# Patient Record
Sex: FEMALE | Race: WHITE | NOT HISPANIC OR LATINO | Employment: UNEMPLOYED | URBAN - NONMETROPOLITAN AREA
[De-identification: names, ages, dates, MRNs, and addresses within clinical notes are randomized per-mention and may not be internally consistent; named-entity substitution may affect disease eponyms.]

---

## 2019-07-08 ENCOUNTER — HOSPITAL ENCOUNTER (EMERGENCY)
Facility: HOSPITAL | Age: 54
Discharge: HOME | End: 2019-07-09
Attending: EMERGENCY MEDICINE | Admitting: EMERGENCY MEDICINE

## 2019-07-08 VITALS
DIASTOLIC BLOOD PRESSURE: 72 MMHG | HEART RATE: 74 BPM | HEIGHT: 61 IN | RESPIRATION RATE: 18 BRPM | TEMPERATURE: 97.9 F | OXYGEN SATURATION: 96 % | WEIGHT: 127 LBS | BODY MASS INDEX: 23.98 KG/M2 | SYSTOLIC BLOOD PRESSURE: 137 MMHG

## 2019-07-08 DIAGNOSIS — K29.80 DUODENITIS: Primary | ICD-10-CM

## 2019-07-08 LAB
BACTERIA UR QL AUTO: ABNORMAL /HPF
BILIRUB UR QL STRIP: NEGATIVE
CLARITY UR: ABNORMAL
COLOR UR: YELLOW
GLUCOSE UR STRIP-MCNC: NEGATIVE MG/DL
HCG SERPL QL: NEGATIVE
HGB UR QL STRIP.AUTO: ABNORMAL
HYALINE CASTS UR QL AUTO: ABNORMAL /LPF
KETONES UR QL STRIP: NEGATIVE
LEUKOCYTE ESTERASE UR QL STRIP.AUTO: ABNORMAL
NITRITE UR QL STRIP: NEGATIVE
PH UR STRIP.AUTO: 7 [PH] (ref 5–8)
PROT UR QL STRIP: NEGATIVE
RBC # UR: ABNORMAL /HPF
REF LAB TEST METHOD: ABNORMAL
SP GR UR STRIP: 1.02 (ref 1–1.03)
SQUAMOUS #/AREA URNS HPF: ABNORMAL /HPF
UROBILINOGEN UR QL STRIP: ABNORMAL
WBC UR QL AUTO: ABNORMAL /HPF

## 2019-07-08 PROCEDURE — 99283 EMERGENCY DEPT VISIT LOW MDM: CPT

## 2019-07-08 PROCEDURE — 83690 ASSAY OF LIPASE: CPT | Performed by: EMERGENCY MEDICINE

## 2019-07-08 PROCEDURE — 80053 COMPREHEN METABOLIC PANEL: CPT | Performed by: EMERGENCY MEDICINE

## 2019-07-08 PROCEDURE — 81001 URINALYSIS AUTO W/SCOPE: CPT | Performed by: EMERGENCY MEDICINE

## 2019-07-08 PROCEDURE — 84703 CHORIONIC GONADOTROPIN ASSAY: CPT | Performed by: EMERGENCY MEDICINE

## 2019-07-08 PROCEDURE — 85025 COMPLETE CBC W/AUTO DIFF WBC: CPT | Performed by: EMERGENCY MEDICINE

## 2019-07-08 PROCEDURE — 85007 BL SMEAR W/DIFF WBC COUNT: CPT | Performed by: EMERGENCY MEDICINE

## 2019-07-08 PROCEDURE — 85060 BLOOD SMEAR INTERPRETATION: CPT | Performed by: EMERGENCY MEDICINE

## 2019-07-09 ENCOUNTER — APPOINTMENT (OUTPATIENT)
Dept: CT IMAGING | Facility: HOSPITAL | Age: 54
End: 2019-07-09

## 2019-07-09 LAB
ALBUMIN SERPL-MCNC: 4.36 G/DL (ref 3.5–5.2)
ALBUMIN/GLOB SERPL: 1.4 G/DL
ALP SERPL-CCNC: 80 U/L (ref 39–117)
ALT SERPL W P-5'-P-CCNC: 16 U/L (ref 1–33)
ANION GAP SERPL CALCULATED.3IONS-SCNC: 11.4 MMOL/L (ref 5–15)
AST SERPL-CCNC: 17 U/L (ref 1–32)
BILIRUB SERPL-MCNC: <0.2 MG/DL (ref 0.2–1.2)
BUN BLD-MCNC: 16 MG/DL (ref 6–20)
BUN/CREAT SERPL: 17.2 (ref 7–25)
CALCIUM SPEC-SCNC: 9.1 MG/DL (ref 8.6–10.5)
CHLORIDE SERPL-SCNC: 107 MMOL/L (ref 98–107)
CO2 SERPL-SCNC: 24.6 MMOL/L (ref 22–29)
CREAT BLD-MCNC: 0.93 MG/DL (ref 0.57–1)
CYTOLOGIST CVX/VAG CYTO: NORMAL
DEPRECATED RDW RBC AUTO: 43.8 FL (ref 37–54)
ERYTHROCYTE [DISTWIDTH] IN BLOOD BY AUTOMATED COUNT: 12.9 % (ref 12.3–15.4)
GFR SERPL CREATININE-BSD FRML MDRD: 63 ML/MIN/1.73
GLOBULIN UR ELPH-MCNC: 3 GM/DL
GLUCOSE BLD-MCNC: 105 MG/DL (ref 65–99)
HCT VFR BLD AUTO: 41.4 % (ref 34–46.6)
HGB BLD-MCNC: 13.6 G/DL (ref 12–15.9)
HOLD SPECIMEN: NORMAL
HOLD SPECIMEN: NORMAL
HYPOCHROMIA BLD QL: ABNORMAL
LIPASE SERPL-CCNC: 43 U/L (ref 13–60)
LYMPHOCYTES # BLD MANUAL: 6.81 10*3/MM3 (ref 0.7–3.1)
LYMPHOCYTES NFR BLD MANUAL: 2 % (ref 5–12)
LYMPHOCYTES NFR BLD MANUAL: 57 % (ref 19.6–45.3)
MACROCYTES BLD QL SMEAR: ABNORMAL
MCH RBC QN AUTO: 31.6 PG (ref 26.6–33)
MCHC RBC AUTO-ENTMCNC: 32.9 G/DL (ref 31.5–35.7)
MCV RBC AUTO: 96.3 FL (ref 79–97)
MONOCYTES # BLD AUTO: 0.24 10*3/MM3 (ref 0.1–0.9)
NEUTROPHILS # BLD AUTO: 4.9 10*3/MM3 (ref 1.7–7)
NEUTROPHILS NFR BLD MANUAL: 41 % (ref 42.7–76)
PATH INTERP BLD-IMP: NORMAL
PLAT MORPH BLD: NORMAL
PLATELET # BLD AUTO: 263 10*3/MM3 (ref 140–450)
PMV BLD AUTO: 9.5 FL (ref 6–12)
POTASSIUM BLD-SCNC: 4 MMOL/L (ref 3.5–5.2)
PROT SERPL-MCNC: 7.4 G/DL (ref 6–8.5)
RBC # BLD AUTO: 4.3 10*6/MM3 (ref 3.77–5.28)
SODIUM BLD-SCNC: 143 MMOL/L (ref 136–145)
WBC NRBC COR # BLD: 11.94 10*3/MM3 (ref 3.4–10.8)
WHOLE BLOOD HOLD SPECIMEN: NORMAL
WHOLE BLOOD HOLD SPECIMEN: NORMAL

## 2019-07-09 PROCEDURE — 96365 THER/PROPH/DIAG IV INF INIT: CPT

## 2019-07-09 PROCEDURE — 25010000002 PROMETHAZINE PER 50 MG: Performed by: EMERGENCY MEDICINE

## 2019-07-09 PROCEDURE — 96361 HYDRATE IV INFUSION ADD-ON: CPT

## 2019-07-09 PROCEDURE — 96375 TX/PRO/DX INJ NEW DRUG ADDON: CPT

## 2019-07-09 PROCEDURE — 74177 CT ABD & PELVIS W/CONTRAST: CPT | Performed by: RADIOLOGY

## 2019-07-09 PROCEDURE — 0 IOVERSOL 74 % SOLUTION: Performed by: EMERGENCY MEDICINE

## 2019-07-09 PROCEDURE — 74177 CT ABD & PELVIS W/CONTRAST: CPT

## 2019-07-09 PROCEDURE — 25010000002 BUTORPHANOL PER 1 MG: Performed by: EMERGENCY MEDICINE

## 2019-07-09 RX ORDER — SODIUM CHLORIDE 9 MG/ML
125 INJECTION, SOLUTION INTRAVENOUS CONTINUOUS
Status: DISCONTINUED | OUTPATIENT
Start: 2019-07-09 | End: 2019-07-09 | Stop reason: HOSPADM

## 2019-07-09 RX ORDER — BUTORPHANOL TARTRATE 1 MG/ML
0.5 INJECTION, SOLUTION INTRAMUSCULAR; INTRAVENOUS ONCE
Status: COMPLETED | OUTPATIENT
Start: 2019-07-09 | End: 2019-07-09

## 2019-07-09 RX ORDER — SODIUM CHLORIDE 0.9 % (FLUSH) 0.9 %
10 SYRINGE (ML) INJECTION AS NEEDED
Status: DISCONTINUED | OUTPATIENT
Start: 2019-07-09 | End: 2019-07-09 | Stop reason: HOSPADM

## 2019-07-09 RX ORDER — FAMOTIDINE 20 MG/1
20 TABLET, FILM COATED ORAL NIGHTLY
Qty: 30 TABLET | Refills: 0 | Status: SHIPPED | OUTPATIENT
Start: 2019-07-09 | End: 2020-09-28

## 2019-07-09 RX ADMIN — IOVERSOL 100 ML: 741 INJECTION INTRA-ARTERIAL; INTRAVENOUS at 01:20

## 2019-07-09 RX ADMIN — SODIUM CHLORIDE 500 ML: 9 INJECTION, SOLUTION INTRAVENOUS at 01:33

## 2019-07-09 RX ADMIN — BUTORPHANOL TARTRATE 0.5 MG: 1 INJECTION, SOLUTION INTRAMUSCULAR; INTRAVENOUS at 02:15

## 2019-07-09 RX ADMIN — SODIUM CHLORIDE 125 ML/HR: 9 INJECTION, SOLUTION INTRAVENOUS at 01:33

## 2019-07-09 RX ADMIN — PROMETHAZINE HYDROCHLORIDE 12.5 MG: 25 INJECTION INTRAMUSCULAR; INTRAVENOUS at 02:14

## 2019-07-09 NOTE — ED PROVIDER NOTES
Subjective   Patient comes in with ongoing right-sided abdominal pain.  Radiates through to the back.  She has had episodes waxing and waning over the past several weeks.  No injury.  She has had nausea.  No vomiting.  No fever.  No diarrhea.  She is status post cholecystectomy.        History provided by:  Patient  Abdominal Pain   Pain location:  RUQ  Pain quality: aching and dull    Pain radiates to:  Back  Pain severity:  Moderate  Onset quality:  Gradual  Timing:  Constant  Progression:  Waxing and waning  Chronicity:  Recurrent  Context: not alcohol use, not awakening from sleep, not diet changes, not eating, not laxative use, not medication withdrawal, not previous surgeries, not recent illness, not recent sexual activity, not recent travel, not retching, not sick contacts, not suspicious food intake and not trauma    Relieved by:  Nothing  Worsened by:  Eating  Ineffective treatments:  None tried  Associated symptoms: anorexia and nausea    Associated symptoms: no belching, no chest pain, no chills, no constipation, no cough, no diarrhea, no dysuria, no fatigue, no fever, no flatus, no hematemesis, no hematochezia, no hematuria, no melena, no shortness of breath, no sore throat and no vomiting    Risk factors: no alcohol abuse, no aspirin use, not elderly, has not had multiple surgeries, no NSAID use, not obese, not pregnant and no recent hospitalization        Review of Systems   Constitutional: Negative.  Negative for chills, fatigue and fever.   HENT: Negative.  Negative for sore throat.    Eyes: Negative.    Respiratory: Negative.  Negative for cough, chest tightness and shortness of breath.    Cardiovascular: Negative.  Negative for chest pain.   Gastrointestinal: Positive for abdominal pain, anorexia and nausea. Negative for abdominal distention, constipation, diarrhea, flatus, hematemesis, hematochezia, melena and vomiting.   Endocrine: Negative.    Genitourinary: Negative.  Negative for difficulty  urinating, dysuria and hematuria.   Musculoskeletal: Negative.    Skin: Negative.    Allergic/Immunologic: Negative.    Neurological: Negative.    Hematological: Negative.    Psychiatric/Behavioral: Negative.    All other systems reviewed and are negative.      No past medical history on file.    Allergies   Allergen Reactions   • Codeine Nausea And Vomiting       No past surgical history on file.    No family history on file.    Social History     Socioeconomic History   • Marital status:      Spouse name: Not on file   • Number of children: Not on file   • Years of education: Not on file   • Highest education level: Not on file           Objective   Physical Exam   Constitutional: She is oriented to person, place, and time. She appears well-developed and well-nourished.  Non-toxic appearance. She does not appear ill. No distress.   HENT:   Head: Normocephalic and atraumatic.   Mouth/Throat: Oropharynx is clear and moist. No oropharyngeal exudate.   Eyes: EOM are normal. No scleral icterus.   Cardiovascular: Normal rate, regular rhythm, normal heart sounds and intact distal pulses.   No murmur heard.  Pulmonary/Chest: Effort normal and breath sounds normal. No stridor. No respiratory distress. She has no wheezes. She has no rhonchi. She has no rales.   Abdominal: Soft. Normal appearance and bowel sounds are normal. She exhibits no distension. There is tenderness in the periumbilical area. There is no rigidity and no guarding.   Right periumbilical tenderness   Genitourinary:   Genitourinary Comments: No CVAT   Neurological: She is alert and oriented to person, place, and time.   Skin: Skin is warm and dry. Capillary refill takes less than 2 seconds. She is not diaphoretic. No cyanosis. No pallor.   Psychiatric: She has a normal mood and affect. Her behavior is normal.   Nursing note and vitals reviewed.      Procedures           ED Course      CT Abdomen Pelvis With Contrast   ED Interpretation   CT abdomen  and pelvis with contrast   Fax report from virtual radiologic   Impression: Questionable mucosal prominence around the duodenal bulb may represent inflammation.   Signed Mark Mccain MD        Labs Reviewed   COMPREHENSIVE METABOLIC PANEL - Abnormal; Notable for the following components:       Result Value    Glucose 105 (*)     Total Bilirubin <0.2 (*)     All other components within normal limits    Narrative:     GFR Normal >60  Chronic Kidney Disease <60  Kidney Failure <15   URINALYSIS W/ MICROSCOPIC IF INDICATED (NO CULTURE) - Abnormal; Notable for the following components:    Appearance, UA Cloudy (*)     Blood, UA Moderate (2+) (*)     Leuk Esterase, UA Moderate (2+) (*)     All other components within normal limits   CBC WITH AUTO DIFFERENTIAL - Abnormal; Notable for the following components:    WBC 11.94 (*)     All other components within normal limits   URINALYSIS, MICROSCOPIC ONLY - Abnormal; Notable for the following components:    RBC, UA 31-50 (*)     WBC, UA 21-30 (*)     All other components within normal limits   MANUAL DIFFERENTIAL - Abnormal; Notable for the following components:    Neutrophil % 41.0 (*)     Lymphocyte % 57.0 (*)     Monocyte % 2.0 (*)     Lymphocytes Absolute 6.81 (*)     All other components within normal limits   LIPASE - Normal   HCG, SERUM, QUALITATIVE - Normal   RAINBOW DRAW    Narrative:     The following orders were created for panel order Suring Draw.  Procedure                               Abnormality         Status                     ---------                               -----------         ------                     Light Blue Top[[id number]]                                   Final result               Green Top (Gel)[[id number]]                                  Final result               Lavender Top[[id number]]                                     Final result               Gold Top - [facility][[id number]]                                   Final result                  Please view results for these tests on the individual orders.   SLIDE REVIEW, HEMATOLOGY   CBC AND DIFFERENTIAL    Narrative:     The following orders were created for panel order CBC & Differential.  Procedure                               Abnormality         Status                     ---------                               -----------         ------                     CBC Auto Differential[[id number]]        Abnormal            Final result                 Please view results for these tests on the individual orders.   LIGHT BLUE TOP   GREEN TOP   LAVENDER TOP   GOLD TOP - [facility]        Medication List      START taking these medications    esomeprazole 20 MG capsule  Commonly known as:  NEXIUM  Take 1 capsule by mouth Every Morning Before Breakfast.     famotidine 20 MG tablet  Commonly known as:  PEPCID  Take 1 tablet by mouth Every Night.                    MDM  Number of Diagnoses or Management Options  Duodenitis: new and requires workup     Amount and/or Complexity of Data Reviewed  Clinical lab tests: ordered and reviewed  Tests in the radiology section of CPT®: ordered and reviewed  Obtain history from someone other than the patient: yes  Independent visualization of images, tracings, or specimens: yes    Risk of Complications, Morbidity, and/or Mortality  Presenting problems: high  Diagnostic procedures: high  Management options: moderate    Patient Progress  Patient progress: improved        Final diagnoses:   Duodenitis            David Lewis MD  07/09/19 0424

## 2020-09-28 ENCOUNTER — OFFICE VISIT (OUTPATIENT)
Dept: GASTROENTEROLOGY | Facility: CLINIC | Age: 55
End: 2020-09-28

## 2020-09-28 VITALS
TEMPERATURE: 96.9 F | BODY MASS INDEX: 21.26 KG/M2 | WEIGHT: 112.6 LBS | SYSTOLIC BLOOD PRESSURE: 137 MMHG | DIASTOLIC BLOOD PRESSURE: 70 MMHG | OXYGEN SATURATION: 98 % | HEART RATE: 69 BPM | HEIGHT: 61 IN

## 2020-09-28 DIAGNOSIS — R19.7 DIARRHEA, UNSPECIFIED TYPE: ICD-10-CM

## 2020-09-28 DIAGNOSIS — R14.0 BLOATING: ICD-10-CM

## 2020-09-28 DIAGNOSIS — R11.0 NAUSEA: ICD-10-CM

## 2020-09-28 DIAGNOSIS — R10.11 RUQ ABDOMINAL PAIN: Primary | ICD-10-CM

## 2020-09-28 PROCEDURE — 99244 OFF/OP CNSLTJ NEW/EST MOD 40: CPT | Performed by: PHYSICIAN ASSISTANT

## 2020-09-28 PROCEDURE — 83690 ASSAY OF LIPASE: CPT | Performed by: PHYSICIAN ASSISTANT

## 2020-09-28 PROCEDURE — 86140 C-REACTIVE PROTEIN: CPT | Performed by: PHYSICIAN ASSISTANT

## 2020-09-28 PROCEDURE — 82150 ASSAY OF AMYLASE: CPT | Performed by: PHYSICIAN ASSISTANT

## 2020-09-28 PROCEDURE — 85025 COMPLETE CBC W/AUTO DIFF WBC: CPT | Performed by: PHYSICIAN ASSISTANT

## 2020-09-28 PROCEDURE — 80053 COMPREHEN METABOLIC PANEL: CPT | Performed by: PHYSICIAN ASSISTANT

## 2020-09-28 PROCEDURE — 36415 COLL VENOUS BLD VENIPUNCTURE: CPT | Performed by: PHYSICIAN ASSISTANT

## 2020-09-28 NOTE — PROGRESS NOTES
: 1965    Chief Complaint   Patient presents with   • Abdominal Pain       Lolita Ng is a 55 y.o. female who presents to the office today as a consultation from AISLINN Gibbons for evaluation of Abdominal Pain.    History of Present Illness:  She has been having abdominal pain and diarrhea for the past 2 months. Has 3-4 stools daily. Has pictures of her stool (loose brown stool). When first presented, she had dehydration and increased WBC. She was given Flagyl for treatment at first but that did not help. She has been having RUQ abdominal pain which radiates into her right mid back. Pain is sometimes worse after eating. Has weakness, has lost 14 lbs during this illness. Has diarrhea, which is most of the time oily and yellow. Nausea is present all throughout the day. Awakens with dry heaving. Has frequent belching, bloating and increased HR sporadically. Sometimes has dysphagia and describes this as a feeling that there is thickness in her neck. Has history of H pylori, diagnosed with serum testing. Had antibiotics x2 for treatment but never had breath test confirmation.     Has not had any testing for this problem yet.     Review of Systems   Constitutional: Positive for activity change, appetite change, fatigue and unexpected weight change. Negative for chills and fever.   HENT: Positive for trouble swallowing.    Eyes: Negative.    Respiratory: Negative for cough, choking, chest tightness and shortness of breath.    Cardiovascular: Negative for chest pain.   Gastrointestinal: Positive for abdominal distention, abdominal pain, diarrhea and nausea. Negative for anal bleeding, blood in stool, constipation and vomiting.   Endocrine: Negative.    Genitourinary: Negative for difficulty urinating.   Musculoskeletal: Positive for back pain. Negative for neck pain.   Skin: Negative for color change, pallor, rash and wound.   Allergic/Immunologic: Negative for environmental allergies and food  "allergies.   Neurological: Positive for dizziness, light-headedness and headaches.   Hematological: Does not bruise/bleed easily.   Psychiatric/Behavioral: Negative.      I have reviewed and confirmed the accuracy of the ROS as documented by the MA/LPN/RN Juanita Alejo PA-C     History reviewed. No pertinent past medical history.    Past Surgical History:   Procedure Laterality Date   • BREAST IMPLANT REMOVAL     • BREAST IMPLANT SURGERY     • CHOLECYSTECTOMY     • TUBAL ABDOMINAL LIGATION         Family History   Problem Relation Age of Onset   • Ovarian cancer Mother        Social History     Socioeconomic History   • Marital status:      Spouse name: Not on file   • Number of children: Not on file   • Years of education: Not on file   • Highest education level: Not on file   Tobacco Use   • Smoking status: Current Every Day Smoker   • Smokeless tobacco: Never Used   Substance and Sexual Activity   • Alcohol use: Never     Frequency: Never   • Drug use: Never   • Sexual activity: Defer     No current outpatient medications on file.    Allergies:   Codeine    Vitals:  /70 (BP Location: Left arm, Patient Position: Sitting, Cuff Size: Adult)   Pulse 69   Temp 96.9 °F (36.1 °C)   Ht 154.9 cm (61\")   Wt 51.1 kg (112 lb 9.6 oz)   SpO2 98%   BMI 21.28 kg/m²     Physical Exam  Vitals signs reviewed.   Constitutional:       General: She is not in acute distress.     Appearance: Normal appearance. She is well-developed. She is not ill-appearing or diaphoretic.   HENT:      Head: Normocephalic and atraumatic.      Right Ear: External ear normal.      Left Ear: External ear normal.      Nose: Nose normal.      Mouth/Throat:      Comments: Wearing a mask  Eyes:      General: No scleral icterus.        Right eye: No discharge.         Left eye: No discharge.      Conjunctiva/sclera: Conjunctivae normal.   Neck:      Musculoskeletal: Normal range of motion.      Vascular: No JVD.   Cardiovascular:      Rate " and Rhythm: Normal rate and regular rhythm.      Heart sounds: Normal heart sounds. No murmur. No friction rub. No gallop.    Pulmonary:      Effort: Pulmonary effort is normal. No respiratory distress.      Breath sounds: Normal breath sounds. No wheezing or rales.   Chest:      Chest wall: No tenderness.   Abdominal:      General: Bowel sounds are normal. There is no distension.      Palpations: Abdomen is soft. There is no mass.      Tenderness: There is abdominal tenderness (upper, worst in epigastric and RUQ). There is no guarding.   Musculoskeletal: Normal range of motion.         General: No deformity.   Skin:     General: Skin is warm and dry.      Findings: No erythema or rash.   Neurological:      Mental Status: She is alert and oriented to person, place, and time.      Coordination: Coordination normal.   Psychiatric:         Behavior: Behavior normal.         Thought Content: Thought content normal.         Judgment: Judgment normal.      Comments: Anxious affect     Results Review:  CT abd/pelv 2019 duodenal abnormality    Assessment:  1. RUQ abdominal pain    2. Diarrhea, unspecified type    3. Bloating    4. Nausea      Plan:  Orders Placed This Encounter   Procedures   • CT Abdomen Pelvis With Contrast   • Comprehensive Metabolic Panel   • CBC Auto Differential   • Lipase   • C-reactive Protein   • Amylase     Labs will be completed today, she will have CT scan abd/pelv as soon as possible due to severe and persistent GI complaints. Differential diagnoses include duodenal ulcer, choledocholithiasis, gastritis, irritable bowel syndrome, pancreatitis, etc.         Return in about 1 week (around 10/5/2020) for recheck abdominal pain, discussion of results.      Electronically signed 9/29/2020 09:29 EDT  Juanita Alejo PA-C  [facility]

## 2020-09-29 ENCOUNTER — TELEPHONE (OUTPATIENT)
Dept: GASTROENTEROLOGY | Facility: CLINIC | Age: 55
End: 2020-09-29

## 2020-09-29 ENCOUNTER — HOSPITAL ENCOUNTER (OUTPATIENT)
Dept: CT IMAGING | Facility: HOSPITAL | Age: 55
Discharge: HOME | End: 2020-09-29
Admitting: PHYSICIAN ASSISTANT

## 2020-09-29 DIAGNOSIS — R11.0 NAUSEA: ICD-10-CM

## 2020-09-29 DIAGNOSIS — R14.0 BLOATING: ICD-10-CM

## 2020-09-29 DIAGNOSIS — R10.11 RUQ ABDOMINAL PAIN: ICD-10-CM

## 2020-09-29 DIAGNOSIS — R19.7 DIARRHEA, UNSPECIFIED TYPE: ICD-10-CM

## 2020-09-29 DIAGNOSIS — R10.11 RUQ ABDOMINAL PAIN: Primary | ICD-10-CM

## 2020-09-29 LAB
ALBUMIN SERPL-MCNC: 4.6 G/DL (ref 3.5–5.2)
ALBUMIN/GLOB SERPL: 1.3 G/DL
ALP SERPL-CCNC: 63 U/L (ref 39–117)
ALT SERPL W P-5'-P-CCNC: 11 U/L (ref 1–33)
AMYLASE SERPL-CCNC: 79 U/L (ref 28–100)
ANION GAP SERPL CALCULATED.3IONS-SCNC: 8.7 MMOL/L (ref 5–15)
AST SERPL-CCNC: 16 U/L (ref 1–32)
BASOPHILS # BLD AUTO: 0.04 10*3/MM3 (ref 0–0.2)
BASOPHILS NFR BLD AUTO: 0.3 % (ref 0–1.5)
BILIRUB SERPL-MCNC: 0.3 MG/DL (ref 0–1.2)
BUN SERPL-MCNC: 11 MG/DL (ref 6–20)
BUN/CREAT SERPL: 14.1 (ref 7–25)
CALCIUM SPEC-SCNC: 9.6 MG/DL (ref 8.6–10.5)
CHLORIDE SERPL-SCNC: 106 MMOL/L (ref 98–107)
CO2 SERPL-SCNC: 24.3 MMOL/L (ref 22–29)
CREAT SERPL-MCNC: 0.78 MG/DL (ref 0.57–1)
CRP SERPL-MCNC: 0.25 MG/DL (ref 0–0.5)
DEPRECATED RDW RBC AUTO: 45.4 FL (ref 37–54)
EOSINOPHIL # BLD AUTO: 0.04 10*3/MM3 (ref 0–0.4)
EOSINOPHIL NFR BLD AUTO: 0.3 % (ref 0.3–6.2)
ERYTHROCYTE [DISTWIDTH] IN BLOOD BY AUTOMATED COUNT: 12.9 % (ref 12.3–15.4)
GFR SERPL CREATININE-BSD FRML MDRD: 76 ML/MIN/1.73
GLOBULIN UR ELPH-MCNC: 3.5 GM/DL
GLUCOSE SERPL-MCNC: 90 MG/DL (ref 65–99)
HCT VFR BLD AUTO: 41.4 % (ref 34–46.6)
HGB BLD-MCNC: 13.9 G/DL (ref 12–15.9)
IMM GRANULOCYTES # BLD AUTO: 0.02 10*3/MM3 (ref 0–0.05)
IMM GRANULOCYTES NFR BLD AUTO: 0.2 % (ref 0–0.5)
LIPASE SERPL-CCNC: 34 U/L (ref 13–60)
LYMPHOCYTES # BLD AUTO: 3.89 10*3/MM3 (ref 0.7–3.1)
LYMPHOCYTES NFR BLD AUTO: 31.7 % (ref 19.6–45.3)
MCH RBC QN AUTO: 32 PG (ref 26.6–33)
MCHC RBC AUTO-ENTMCNC: 33.6 G/DL (ref 31.5–35.7)
MCV RBC AUTO: 95.4 FL (ref 79–97)
MONOCYTES # BLD AUTO: 0.49 10*3/MM3 (ref 0.1–0.9)
MONOCYTES NFR BLD AUTO: 4 % (ref 5–12)
NEUTROPHILS NFR BLD AUTO: 63.5 % (ref 42.7–76)
NEUTROPHILS NFR BLD AUTO: 7.8 10*3/MM3 (ref 1.7–7)
NRBC BLD AUTO-RTO: 0 /100 WBC (ref 0–0.2)
PLATELET # BLD AUTO: 276 10*3/MM3 (ref 140–450)
PMV BLD AUTO: 10.2 FL (ref 6–12)
POTASSIUM SERPL-SCNC: 4.4 MMOL/L (ref 3.5–5.2)
PROT SERPL-MCNC: 8.1 G/DL (ref 6–8.5)
RBC # BLD AUTO: 4.34 10*6/MM3 (ref 3.77–5.28)
SODIUM SERPL-SCNC: 139 MMOL/L (ref 136–145)
WBC # BLD AUTO: 12.28 10*3/MM3 (ref 3.4–10.8)

## 2020-09-29 PROCEDURE — 25010000002 IOPAMIDOL 61 % SOLUTION: Performed by: PHYSICIAN ASSISTANT

## 2020-09-29 PROCEDURE — 74177 CT ABD & PELVIS W/CONTRAST: CPT

## 2020-09-29 PROCEDURE — 74177 CT ABD & PELVIS W/CONTRAST: CPT | Performed by: RADIOLOGY

## 2020-09-29 RX ADMIN — IOPAMIDOL 100 ML: 612 INJECTION, SOLUTION INTRAVENOUS at 13:43

## 2020-09-29 NOTE — TELEPHONE ENCOUNTER
CT scan read as normal. Labs all normal except increased white blood cell count. I want her to have EGD as soon as possible. Please arrange,

## 2020-10-01 ENCOUNTER — OFFICE VISIT (OUTPATIENT)
Dept: GASTROENTEROLOGY | Facility: CLINIC | Age: 55
End: 2020-10-01

## 2020-10-01 ENCOUNTER — LAB (OUTPATIENT)
Dept: LAB | Facility: HOSPITAL | Age: 55
End: 2020-10-01

## 2020-10-01 VITALS
HEIGHT: 61 IN | HEART RATE: 81 BPM | DIASTOLIC BLOOD PRESSURE: 81 MMHG | SYSTOLIC BLOOD PRESSURE: 154 MMHG | TEMPERATURE: 97.7 F | OXYGEN SATURATION: 99 % | BODY MASS INDEX: 20.96 KG/M2 | WEIGHT: 111 LBS

## 2020-10-01 DIAGNOSIS — R11.2 NAUSEA AND VOMITING, INTRACTABILITY OF VOMITING NOT SPECIFIED, UNSPECIFIED VOMITING TYPE: Primary | ICD-10-CM

## 2020-10-01 DIAGNOSIS — Z01.818 PRE-OPERATIVE CLEARANCE: ICD-10-CM

## 2020-10-01 DIAGNOSIS — Z86.19 HISTORY OF HELICOBACTER PYLORI INFECTION: ICD-10-CM

## 2020-10-01 DIAGNOSIS — R11.2 NAUSEA AND VOMITING, INTRACTABILITY OF VOMITING NOT SPECIFIED, UNSPECIFIED VOMITING TYPE: ICD-10-CM

## 2020-10-01 DIAGNOSIS — R10.11 RUQ ABDOMINAL PAIN: ICD-10-CM

## 2020-10-01 LAB
BILIRUB BLD-MCNC: NEGATIVE MG/DL
CLARITY, POC: CLEAR
COLOR UR: YELLOW
GLUCOSE UR STRIP-MCNC: NEGATIVE MG/DL
KETONES UR QL: NEGATIVE
LEUKOCYTE EST, POC: NEGATIVE
NITRITE UR-MCNC: NEGATIVE MG/ML
PH UR: 7.5 [PH] (ref 5–8)
PROT UR STRIP-MCNC: NEGATIVE MG/DL
RBC # UR STRIP: ABNORMAL /UL
SP GR UR: 1.01 (ref 1–1.03)
UROBILINOGEN UR QL: NORMAL

## 2020-10-01 PROCEDURE — 81003 URINALYSIS AUTO W/O SCOPE: CPT | Performed by: PHYSICIAN ASSISTANT

## 2020-10-01 PROCEDURE — 99214 OFFICE O/P EST MOD 30 MIN: CPT | Performed by: PHYSICIAN ASSISTANT

## 2020-10-01 PROCEDURE — 83013 H PYLORI (C-13) BREATH: CPT

## 2020-10-01 PROCEDURE — 87086 URINE CULTURE/COLONY COUNT: CPT | Performed by: PHYSICIAN ASSISTANT

## 2020-10-01 RX ORDER — PROMETHAZINE HYDROCHLORIDE 12.5 MG/1
12.5 TABLET ORAL EVERY 6 HOURS PRN
Qty: 30 TABLET | Refills: 0 | Status: SHIPPED | OUTPATIENT
Start: 2020-10-01 | End: 2020-10-22

## 2020-10-01 RX ORDER — PANTOPRAZOLE SODIUM 40 MG/1
40 TABLET, DELAYED RELEASE ORAL
Qty: 60 TABLET | Refills: 5 | Status: SHIPPED | OUTPATIENT
Start: 2020-10-01 | End: 2020-10-22 | Stop reason: ALTCHOICE

## 2020-10-01 RX ORDER — SUCRALFATE 1 G/1
1 TABLET ORAL
Qty: 120 TABLET | Refills: 2 | Status: SHIPPED | OUTPATIENT
Start: 2020-10-01 | End: 2020-10-22 | Stop reason: SINTOL

## 2020-10-01 RX ORDER — SUCRALFATE ORAL 1 G/10ML
1 SUSPENSION ORAL
Qty: 840 ML | Refills: 1 | Status: CANCELLED | OUTPATIENT
Start: 2020-10-01

## 2020-10-01 RX ORDER — THIAMINE HCL 50 MG
50 TABLET ORAL DAILY
COMMUNITY
End: 2023-03-03 | Stop reason: ALTCHOICE

## 2020-10-01 NOTE — PROGRESS NOTES
: 1965    Chief Complaint   Patient presents with   • Abdominal Pain   • Nausea       Lolita Ng is a 55 y.o. female who presents to the office today as a follow up appointment regarding Abdominal Pain and Nausea.    History of Present Illness:  Abdominal pain improved since last visit. Now having severe nausea and some vomiting. She has weakness and fatigue. She is afraid that she has a fungal infection or sepsis related to H pylori. Denies any recent fever, did have previous elevated WBC. She feels that her tongue has a coating on it. She has been trying to keep it clean by brushing it. Had CT scan 2 days ago and would like to discuss those results. Has not taken any antibiotics or PPIs in the past 2 weeks.     Previous history:  She has been having abdominal pain and diarrhea for the past 2 months. Has 3-4 stools daily. Has pictures of her stool (loose brown stool). When first presented, she had dehydration and increased WBC. She was given Flagyl for treatment at first but that did not help. She has been having RUQ abdominal pain which radiates into her right mid back. Pain is sometimes worse after eating. Has weakness, has lost 14 lbs during this illness. Has diarrhea, which is most of the time oily and yellow. Nausea is present all throughout the day. Awakens with dry heaving. Has frequent belching, bloating and increased HR sporadically. Sometimes has dysphagia and describes this as a feeling that there is thickness in her neck. Has history of H pylori, diagnosed with serum testing. Had antibiotics x2 for treatment but never had breath test confirmation.     Review of Systems   Constitutional: Positive for activity change, appetite change, fatigue and unexpected weight change. Negative for chills and fever.   HENT: Positive for trouble swallowing.    Eyes: Negative.    Respiratory: Negative for cough, choking, chest tightness and shortness of breath.    Cardiovascular: Negative for chest pain.    "  Gastrointestinal: Positive for abdominal distention, abdominal pain, nausea and vomiting. Negative for anal bleeding, blood in stool, constipation and diarrhea.   Endocrine: Negative.    Genitourinary: Negative for difficulty urinating.   Musculoskeletal: Positive for back pain. Negative for neck pain.   Skin: Negative for color change, pallor, rash and wound.   Allergic/Immunologic: Negative for environmental allergies and food allergies.   Neurological: Positive for dizziness, light-headedness and headaches.   Hematological: Does not bruise/bleed easily.   Psychiatric/Behavioral: Negative.      I have reviewed and confirmed the accuracy of the ROS as documented by the MA/LPN/RN Juanita Alejo PA-C    Past Medical History:   Diagnosis Date   • H. pylori infection        Past Surgical History:   Procedure Laterality Date   • BREAST IMPLANT REMOVAL     • BREAST IMPLANT SURGERY     • CHOLECYSTECTOMY     • TUBAL ABDOMINAL LIGATION         Family History   Problem Relation Age of Onset   • Ovarian cancer Mother        Social History     Socioeconomic History   • Marital status:      Spouse name: Not on file   • Number of children: Not on file   • Years of education: Not on file   • Highest education level: Not on file   Tobacco Use   • Smoking status: Current Every Day Smoker   • Smokeless tobacco: Never Used   Substance and Sexual Activity   • Alcohol use: Never     Frequency: Never   • Drug use: Never   • Sexual activity: Defer       Current Outpatient Medications:   •  Thiamine HCl (vitamin B-1) 50 MG tablet, Take 50 mg by mouth Daily., Disp: , Rfl:     Allergies:   Codeine    Vitals:  /81 (BP Location: Left arm, Patient Position: Sitting, Cuff Size: Adult)   Pulse 81   Temp 97.7 °F (36.5 °C)   Ht 154.9 cm (61\")   Wt 50.3 kg (111 lb)   SpO2 99%   BMI 20.97 kg/m²     Physical Exam  Vitals signs reviewed.   Constitutional:       General: She is in acute distress.      Appearance: Normal appearance. " She is well-developed. She is not ill-appearing or diaphoretic.      Comments: Holding bag under chin   HENT:      Head: Normocephalic and atraumatic.      Right Ear: External ear normal.      Left Ear: External ear normal.      Nose: Nose normal.      Mouth/Throat:      Comments: Wearing a mask  Eyes:      General: No scleral icterus.        Right eye: No discharge.         Left eye: No discharge.      Conjunctiva/sclera: Conjunctivae normal.   Neck:      Musculoskeletal: Normal range of motion.      Vascular: No JVD.   Cardiovascular:      Rate and Rhythm: Normal rate and regular rhythm.      Heart sounds: Normal heart sounds. No murmur. No friction rub. No gallop.    Pulmonary:      Effort: Pulmonary effort is normal. No respiratory distress.      Breath sounds: Normal breath sounds. No wheezing or rales.   Chest:      Chest wall: No tenderness.   Abdominal:      General: Bowel sounds are normal. There is no distension.      Palpations: Abdomen is soft. There is no mass.      Tenderness: There is no abdominal tenderness. There is no guarding.   Musculoskeletal: Normal range of motion.         General: No deformity.   Skin:     General: Skin is warm and dry.      Findings: No erythema or rash.   Neurological:      Mental Status: She is alert and oriented to person, place, and time.      Coordination: Coordination normal.   Psychiatric:         Mood and Affect: Mood normal.         Behavior: Behavior normal.         Thought Content: Thought content normal.         Judgment: Judgment normal.     Results Review:  CT abd/pelv reported as normal. On my review- On my review- liver lesion noted, aortic atherosclerosis, renal cysts, lynne clips, ?duodenum appears similar to previous scan, no increased stool, some tortuosity in hepatic flexure colon.    Assessment:  1. Nausea and vomiting, intractability of vomiting not specified, unspecified vomiting type    2. RUQ abdominal pain    3. History of Helicobacter pylori  infection    4. Pre-operative clearance      Plan:  Orders Placed This Encounter   Procedures   • Urine Culture - Urine, Urine, Random Void   • H. Pylori Breath Test - , Lung   • COVID PRE-OP / PRE-PROCEDURE SCREENING ORDER (NO ISOLATION) - Swab, Nasopharynx   • POC Urinalysis Dipstick, Automated     She will need an esophagogastroduodenoscopy performed with IV general sedation. Will arrange ASAP, scheduled for next available (10/5). All of the risks, benefits and alternatives of this procedure have been discussed with her, all of her questions have been answered and she has elected to proceed. She should follow up in the office after this procedure to discuss the results and further recommendations can be made at that time.    New Medications Ordered This Visit   Medications   • pantoprazole (Protonix) 40 MG EC tablet     Sig: Take 1 tablet by mouth 2 (Two) Times a Day Before Meals.     Dispense:  60 tablet     Refill:  5   • sucralfate (CARAFATE) 1 g tablet     Sig: Take 1 tablet by mouth 4 (Four) Times a Day Before Meals & at Bedtime.     Dispense:  120 tablet     Refill:  2   • promethazine (PHENERGAN) 12.5 MG tablet     Sig: Take 1 tablet by mouth Every 6 (Six) Hours As Needed for Nausea or Vomiting.     Dispense:  30 tablet     Refill:  0     Will treat as suspected ulcer. She declined reporting to the ED and would like to try outpatient treatment for now. Call with concerns.          Return for follow up after procedure to discuss results.      Electronically signed 10/1/2020 16:18 EDT  Juanita Alejo PA-C  [facility]

## 2020-10-01 NOTE — H&P (VIEW-ONLY)
: 1965    Chief Complaint   Patient presents with   • Abdominal Pain   • Nausea       Lolita Ng is a 55 y.o. female who presents to the office today as a follow up appointment regarding Abdominal Pain and Nausea.    History of Present Illness:  Abdominal pain improved since last visit. Now having severe nausea and some vomiting. She has weakness and fatigue. She is afraid that she has a fungal infection or sepsis related to H pylori. Denies any recent fever, did have previous elevated WBC. She feels that her tongue has a coating on it. She has been trying to keep it clean by brushing it. Had CT scan 2 days ago and would like to discuss those results. Has not taken any antibiotics or PPIs in the past 2 weeks.     Previous history:  She has been having abdominal pain and diarrhea for the past 2 months. Has 3-4 stools daily. Has pictures of her stool (loose brown stool). When first presented, she had dehydration and increased WBC. She was given Flagyl for treatment at first but that did not help. She has been having RUQ abdominal pain which radiates into her right mid back. Pain is sometimes worse after eating. Has weakness, has lost 14 lbs during this illness. Has diarrhea, which is most of the time oily and yellow. Nausea is present all throughout the day. Awakens with dry heaving. Has frequent belching, bloating and increased HR sporadically. Sometimes has dysphagia and describes this as a feeling that there is thickness in her neck. Has history of H pylori, diagnosed with serum testing. Had antibiotics x2 for treatment but never had breath test confirmation.     Review of Systems   Constitutional: Positive for activity change, appetite change, fatigue and unexpected weight change. Negative for chills and fever.   HENT: Positive for trouble swallowing.    Eyes: Negative.    Respiratory: Negative for cough, choking, chest tightness and shortness of breath.    Cardiovascular: Negative for chest pain.    "  Gastrointestinal: Positive for abdominal distention, abdominal pain, nausea and vomiting. Negative for anal bleeding, blood in stool, constipation and diarrhea.   Endocrine: Negative.    Genitourinary: Negative for difficulty urinating.   Musculoskeletal: Positive for back pain. Negative for neck pain.   Skin: Negative for color change, pallor, rash and wound.   Allergic/Immunologic: Negative for environmental allergies and food allergies.   Neurological: Positive for dizziness, light-headedness and headaches.   Hematological: Does not bruise/bleed easily.   Psychiatric/Behavioral: Negative.      I have reviewed and confirmed the accuracy of the ROS as documented by the MA/LPN/RN Juanita Alejo PA-C    Past Medical History:   Diagnosis Date   • H. pylori infection        Past Surgical History:   Procedure Laterality Date   • BREAST IMPLANT REMOVAL     • BREAST IMPLANT SURGERY     • CHOLECYSTECTOMY     • TUBAL ABDOMINAL LIGATION         Family History   Problem Relation Age of Onset   • Ovarian cancer Mother        Social History     Socioeconomic History   • Marital status:      Spouse name: Not on file   • Number of children: Not on file   • Years of education: Not on file   • Highest education level: Not on file   Tobacco Use   • Smoking status: Current Every Day Smoker   • Smokeless tobacco: Never Used   Substance and Sexual Activity   • Alcohol use: Never     Frequency: Never   • Drug use: Never   • Sexual activity: Defer       Current Outpatient Medications:   •  Thiamine HCl (vitamin B-1) 50 MG tablet, Take 50 mg by mouth Daily., Disp: , Rfl:     Allergies:   Codeine    Vitals:  /81 (BP Location: Left arm, Patient Position: Sitting, Cuff Size: Adult)   Pulse 81   Temp 97.7 °F (36.5 °C)   Ht 154.9 cm (61\")   Wt 50.3 kg (111 lb)   SpO2 99%   BMI 20.97 kg/m²     Physical Exam  Vitals signs reviewed.   Constitutional:       General: She is in acute distress.      Appearance: Normal appearance. " She is well-developed. She is not ill-appearing or diaphoretic.      Comments: Holding bag under chin   HENT:      Head: Normocephalic and atraumatic.      Right Ear: External ear normal.      Left Ear: External ear normal.      Nose: Nose normal.      Mouth/Throat:      Comments: Wearing a mask  Eyes:      General: No scleral icterus.        Right eye: No discharge.         Left eye: No discharge.      Conjunctiva/sclera: Conjunctivae normal.   Neck:      Musculoskeletal: Normal range of motion.      Vascular: No JVD.   Cardiovascular:      Rate and Rhythm: Normal rate and regular rhythm.      Heart sounds: Normal heart sounds. No murmur. No friction rub. No gallop.    Pulmonary:      Effort: Pulmonary effort is normal. No respiratory distress.      Breath sounds: Normal breath sounds. No wheezing or rales.   Chest:      Chest wall: No tenderness.   Abdominal:      General: Bowel sounds are normal. There is no distension.      Palpations: Abdomen is soft. There is no mass.      Tenderness: There is no abdominal tenderness. There is no guarding.   Musculoskeletal: Normal range of motion.         General: No deformity.   Skin:     General: Skin is warm and dry.      Findings: No erythema or rash.   Neurological:      Mental Status: She is alert and oriented to person, place, and time.      Coordination: Coordination normal.   Psychiatric:         Mood and Affect: Mood normal.         Behavior: Behavior normal.         Thought Content: Thought content normal.         Judgment: Judgment normal.     Results Review:  CT abd/pelv reported as normal. On my review- On my review- liver lesion noted, aortic atherosclerosis, renal cysts, lynne clips, ?duodenum appears similar to previous scan, no increased stool, some tortuosity in hepatic flexure colon.    Assessment:  1. Nausea and vomiting, intractability of vomiting not specified, unspecified vomiting type    2. RUQ abdominal pain    3. History of Helicobacter pylori  infection    4. Pre-operative clearance      Plan:  Orders Placed This Encounter   Procedures   • Urine Culture - Urine, Urine, Random Void   • H. Pylori Breath Test - , Lung   • COVID PRE-OP / PRE-PROCEDURE SCREENING ORDER (NO ISOLATION) - Swab, Nasopharynx   • POC Urinalysis Dipstick, Automated     She will need an esophagogastroduodenoscopy performed with IV general sedation. Will arrange ASAP, scheduled for next available (10/5). All of the risks, benefits and alternatives of this procedure have been discussed with her, all of her questions have been answered and she has elected to proceed. She should follow up in the office after this procedure to discuss the results and further recommendations can be made at that time.    New Medications Ordered This Visit   Medications   • pantoprazole (Protonix) 40 MG EC tablet     Sig: Take 1 tablet by mouth 2 (Two) Times a Day Before Meals.     Dispense:  60 tablet     Refill:  5   • sucralfate (CARAFATE) 1 g tablet     Sig: Take 1 tablet by mouth 4 (Four) Times a Day Before Meals & at Bedtime.     Dispense:  120 tablet     Refill:  2   • promethazine (PHENERGAN) 12.5 MG tablet     Sig: Take 1 tablet by mouth Every 6 (Six) Hours As Needed for Nausea or Vomiting.     Dispense:  30 tablet     Refill:  0     Will treat as suspected ulcer. She declined reporting to the ED and would like to try outpatient treatment for now. Call with concerns.          Return for follow up after procedure to discuss results.      Electronically signed 10/1/2020 16:18 EDT  Juanita Alejo PA-C  [facility]

## 2020-10-01 NOTE — TELEPHONE ENCOUNTER
I had spoke with patient on 9- and told her that [facility] and she may be asked to sign a paper stating that she may be responsible for the bill if she has CT if it is not approved. Patient voiced understanding.

## 2020-10-02 ENCOUNTER — LAB (OUTPATIENT)
Dept: LAB | Facility: HOSPITAL | Age: 55
End: 2020-10-02

## 2020-10-02 DIAGNOSIS — Z01.818 PRE-OPERATIVE CLEARANCE: ICD-10-CM

## 2020-10-02 PROBLEM — R10.11 RUQ ABDOMINAL PAIN: Status: ACTIVE | Noted: 2020-10-02

## 2020-10-02 LAB
BACTERIA SPEC AEROBE CULT: NORMAL
UREA BREATH TEST QL: POSITIVE

## 2020-10-02 PROCEDURE — U0004 COV-19 TEST NON-CDC HGH THRU: HCPCS

## 2020-10-02 PROCEDURE — C9803 HOPD COVID-19 SPEC COLLECT: HCPCS

## 2020-10-03 LAB — SARS-COV-2 RNA NOSE QL NAA+PROBE: NOT DETECTED

## 2020-10-05 ENCOUNTER — ANESTHESIA (OUTPATIENT)
Dept: PERIOP | Facility: HOSPITAL | Age: 55
End: 2020-10-05

## 2020-10-05 ENCOUNTER — TELEPHONE (OUTPATIENT)
Dept: GASTROENTEROLOGY | Facility: CLINIC | Age: 55
End: 2020-10-05

## 2020-10-05 ENCOUNTER — ANESTHESIA EVENT (OUTPATIENT)
Dept: PERIOP | Facility: HOSPITAL | Age: 55
End: 2020-10-05

## 2020-10-05 ENCOUNTER — HOSPITAL ENCOUNTER (OUTPATIENT)
Facility: HOSPITAL | Age: 55
Setting detail: HOSPITAL OUTPATIENT SURGERY
Discharge: HOME | End: 2020-10-05
Attending: INTERNAL MEDICINE | Admitting: INTERNAL MEDICINE

## 2020-10-05 VITALS
HEIGHT: 61 IN | WEIGHT: 113 LBS | DIASTOLIC BLOOD PRESSURE: 76 MMHG | SYSTOLIC BLOOD PRESSURE: 139 MMHG | HEART RATE: 68 BPM | TEMPERATURE: 97.1 F | BODY MASS INDEX: 21.34 KG/M2 | OXYGEN SATURATION: 99 % | RESPIRATION RATE: 20 BRPM

## 2020-10-05 DIAGNOSIS — R10.11 RUQ ABDOMINAL PAIN: ICD-10-CM

## 2020-10-05 PROCEDURE — 25010000002 PROPOFOL 10 MG/ML EMULSION: Performed by: NURSE ANESTHETIST, CERTIFIED REGISTERED

## 2020-10-05 PROCEDURE — 25010000002 FENTANYL CITRATE (PF) 100 MCG/2ML SOLUTION: Performed by: NURSE ANESTHETIST, CERTIFIED REGISTERED

## 2020-10-05 PROCEDURE — 43239 EGD BIOPSY SINGLE/MULTIPLE: CPT | Performed by: INTERNAL MEDICINE

## 2020-10-05 PROCEDURE — 25010000002 MIDAZOLAM PER 1 MG: Performed by: NURSE ANESTHETIST, CERTIFIED REGISTERED

## 2020-10-05 RX ORDER — IPRATROPIUM BROMIDE AND ALBUTEROL SULFATE 2.5; .5 MG/3ML; MG/3ML
3 SOLUTION RESPIRATORY (INHALATION) ONCE AS NEEDED
Status: DISCONTINUED | OUTPATIENT
Start: 2020-10-05 | End: 2020-10-05 | Stop reason: HOSPADM

## 2020-10-05 RX ORDER — SODIUM CHLORIDE, SODIUM LACTATE, POTASSIUM CHLORIDE, CALCIUM CHLORIDE 600; 310; 30; 20 MG/100ML; MG/100ML; MG/100ML; MG/100ML
125 INJECTION, SOLUTION INTRAVENOUS CONTINUOUS
Status: DISCONTINUED | OUTPATIENT
Start: 2020-10-05 | End: 2020-10-05 | Stop reason: HOSPADM

## 2020-10-05 RX ORDER — MIDAZOLAM HYDROCHLORIDE 1 MG/ML
INJECTION INTRAMUSCULAR; INTRAVENOUS AS NEEDED
Status: DISCONTINUED | OUTPATIENT
Start: 2020-10-05 | End: 2020-10-05 | Stop reason: SURG

## 2020-10-05 RX ORDER — OXYCODONE HYDROCHLORIDE AND ACETAMINOPHEN 5; 325 MG/1; MG/1
1 TABLET ORAL ONCE AS NEEDED
Status: DISCONTINUED | OUTPATIENT
Start: 2020-10-05 | End: 2020-10-05 | Stop reason: HOSPADM

## 2020-10-05 RX ORDER — PROPOFOL 10 MG/ML
VIAL (ML) INTRAVENOUS CONTINUOUS PRN
Status: DISCONTINUED | OUTPATIENT
Start: 2020-10-05 | End: 2020-10-05 | Stop reason: SURG

## 2020-10-05 RX ORDER — SODIUM CHLORIDE 0.9 % (FLUSH) 0.9 %
10 SYRINGE (ML) INJECTION AS NEEDED
Status: DISCONTINUED | OUTPATIENT
Start: 2020-10-05 | End: 2020-10-05 | Stop reason: HOSPADM

## 2020-10-05 RX ORDER — FENTANYL CITRATE 50 UG/ML
INJECTION, SOLUTION INTRAMUSCULAR; INTRAVENOUS AS NEEDED
Status: DISCONTINUED | OUTPATIENT
Start: 2020-10-05 | End: 2020-10-05 | Stop reason: SURG

## 2020-10-05 RX ORDER — ESOMEPRAZOLE MAGNESIUM 40 MG/1
40 CAPSULE, DELAYED RELEASE ORAL 2 TIMES DAILY
Qty: 60 CAPSULE | Refills: 11 | Status: SHIPPED | OUTPATIENT
Start: 2020-10-05 | End: 2020-10-22 | Stop reason: ALTCHOICE

## 2020-10-05 RX ORDER — ONDANSETRON 2 MG/ML
4 INJECTION INTRAMUSCULAR; INTRAVENOUS AS NEEDED
Status: DISCONTINUED | OUTPATIENT
Start: 2020-10-05 | End: 2020-10-05 | Stop reason: HOSPADM

## 2020-10-05 RX ORDER — MIDAZOLAM HYDROCHLORIDE 1 MG/ML
1 INJECTION INTRAMUSCULAR; INTRAVENOUS
Status: DISCONTINUED | OUTPATIENT
Start: 2020-10-05 | End: 2020-10-05 | Stop reason: HOSPADM

## 2020-10-05 RX ORDER — FENTANYL CITRATE 50 UG/ML
50 INJECTION, SOLUTION INTRAMUSCULAR; INTRAVENOUS
Status: DISCONTINUED | OUTPATIENT
Start: 2020-10-05 | End: 2020-10-05 | Stop reason: HOSPADM

## 2020-10-05 RX ORDER — SODIUM CHLORIDE 0.9 % (FLUSH) 0.9 %
10 SYRINGE (ML) INJECTION EVERY 12 HOURS SCHEDULED
Status: DISCONTINUED | OUTPATIENT
Start: 2020-10-05 | End: 2020-10-05 | Stop reason: HOSPADM

## 2020-10-05 RX ADMIN — PROPOFOL 100 MCG/KG/MIN: 10 INJECTION, EMULSION INTRAVENOUS at 12:02

## 2020-10-05 RX ADMIN — FENTANYL CITRATE 100 MCG: 50 INJECTION INTRAMUSCULAR; INTRAVENOUS at 12:02

## 2020-10-05 RX ADMIN — SODIUM CHLORIDE, POTASSIUM CHLORIDE, SODIUM LACTATE AND CALCIUM CHLORIDE: 600; 310; 30; 20 INJECTION, SOLUTION INTRAVENOUS at 12:02

## 2020-10-05 RX ADMIN — SODIUM CHLORIDE, POTASSIUM CHLORIDE, SODIUM LACTATE AND CALCIUM CHLORIDE 125 ML/HR: 600; 310; 30; 20 INJECTION, SOLUTION INTRAVENOUS at 10:34

## 2020-10-05 RX ADMIN — MIDAZOLAM HYDROCHLORIDE 2 MG: 1 INJECTION, SOLUTION INTRAMUSCULAR; INTRAVENOUS at 12:02

## 2020-10-05 NOTE — OP NOTE
ESOPHAGOGASTRODUODENOSCOPY PROCEDURE REPORT    Lolita Ng  10/5/2020    GASTROENTEROLOGIST:  Luca Modi MD    PRE-PROCEDURE DIAGNOSIS:  Dyspepsia.  Abnormal weight loss    POST-PROCEDURE DIAGNOSIS:  1.-Normal esophagus with irregular Z line at 38 cm biopsied rule out short segment Thomas esophagus  2.-  Normal stomach except for small sliding hiatal hernia.  Antral biopsies pending rule out H. pylori  3.-  Clean base 10 mm duodenal bulb ulcer.  Biopsied  4.-  Otherwise normal duodenum.  Biopsied to rule out celiac disease    INDICATION:  As noted in the preprocedure diagnosis    Procedure(s):  ESOPHAGOGASTRODUODENOSCOPY WITH BIOPSY CPT CODE: 35095    ANESTHESIA:  Propofol administered by anesthesia.  See anesthesia notes for ASA classification    STAFF:  Circulator: Davina Bundy RN  Scrub Person: Alexandra Aburto    FINDINGS:  As noted in the post procedure diagnosis    OPERATIVE PROCEDURE:  After proper informed consent was obtained, patient was transferred to the OR/endoscopy suite.  Patient was then placed in left lateral decubitus position. The Olympus 180 series video gastroscope was inserted orally under direct visualization.  Esophagus, stomach, and duodenum were inspected.  The endoscope was passed to the third portion of the duodenum.  Scope was retroflexed for visualization of the cardia and incisuraThe endoscope was then withdrawn. Patient tolerated the procedure well. There were no immediate complications.    ESTIMATED BLOOD LOSS:  None    SPECIMENS:  EG junction biopsies rule out short segment Thomas's esophagus  Antral biopsies pending to rule out H. pylori  Duodenal bulb ulcer biopsies pending  Duodenal biopsies pending rule out celiac disease               COMPLICATIONS;  None    RECOMMENDATIONS/ PLAN:  1.-High-dose proton pump inhibitor therapy such as Nexium 40 mg p.o. twice daily  2.-Repeat EGD in 8 weeks for reassessment of the ulcer (currently on a proton pump  inhibitor with nonresolution, endoscopic confirmation of healing recommended)    Luca Modi MD     10/05/20 12:11 EDT

## 2020-10-05 NOTE — ANESTHESIA PREPROCEDURE EVALUATION
Anesthesia Evaluation     no history of anesthetic complications:  NPO Solid Status: > 8 hours  NPO Liquid Status: > 8 hours           Airway   Mallampati: II  TM distance: >3 FB  Neck ROM: full  No difficulty expected  Dental - normal exam     Pulmonary - normal exam   Cardiovascular - normal exam        Neuro/Psych  GI/Hepatic/Renal/Endo    (+)  GERD,      Musculoskeletal     Abdominal  - normal exam    Bowel sounds: normal.   Substance History      OB/GYN          Other                        Anesthesia Plan    ASA 2     general     intravenous induction     Anesthetic plan, all risks, benefits, and alternatives have been provided, discussed and informed consent has been obtained with: patient.

## 2020-10-05 NOTE — ANESTHESIA POSTPROCEDURE EVALUATION
Patient: Lolita Ng    Procedure Summary     Date: 10/05/20 Room / Location:  [facility] OR [address] [place] [address] [facility]    Anesthesia Start: 1200 Anesthesia Stop: 1212    Procedure: ESOPHAGOGASTRODUODENOSCOPY WITH BIOPSY CPT CODE: 59439 (N/A Esophagus) Diagnosis:       RUQ abdominal pain      (RUQ abdominal pain [R10.11])    Surgeon: Luca Modi MD Provider: Alvin Cummings MD    Anesthesia Type: general ASA Status: 2          Anesthesia Type: general    Vitals  Vitals Value Taken Time   /76 10/05/20 1240   Temp 97.1 °F (36.2 °C) 10/05/20 1215   Pulse 68 10/05/20 1240   Resp 20 10/05/20 1240   SpO2 99 % 10/05/20 1240           Post Anesthesia Care and Evaluation    Patient location during evaluation: bedside  Patient participation: complete - patient participated  Level of consciousness: awake and alert  Pain score: 1  Pain management: adequate  Airway patency: patent  Anesthetic complications: No anesthetic complications  PONV Status: none  Cardiovascular status: acceptable  Respiratory status: acceptable  Hydration status: acceptable

## 2020-10-06 ENCOUNTER — TELEPHONE (OUTPATIENT)
Dept: UROLOGY | Facility: CLINIC | Age: 55
End: 2020-10-06

## 2020-10-08 LAB
LAB AP CASE REPORT: NORMAL
PATH REPORT.FINAL DX SPEC: NORMAL

## 2020-10-09 ENCOUNTER — TELEPHONE (OUTPATIENT)
Dept: GASTROENTEROLOGY | Facility: CLINIC | Age: 55
End: 2020-10-09

## 2020-10-09 RX ORDER — OMEPRAZOLE MAGNESIUM, AMOXICILLIN AND RIFABUTIN 10; 250; 12.5 MG/1; MG/1; MG/1
4 CAPSULE, DELAYED RELEASE ORAL EVERY 8 HOURS
Qty: 168 CAPSULE | Refills: 0 | Status: CANCELLED | OUTPATIENT
Start: 2020-10-09 | End: 2020-10-23

## 2020-10-09 RX ORDER — OMEPRAZOLE MAGNESIUM, AMOXICILLIN AND RIFABUTIN 10; 250; 12.5 MG/1; MG/1; MG/1
4 CAPSULE, DELAYED RELEASE ORAL DAILY
Qty: 168 CAPSULE | Refills: 0 | Status: SHIPPED | OUTPATIENT
Start: 2020-10-09 | End: 2020-10-23

## 2020-10-09 NOTE — TELEPHONE ENCOUNTER
This has not been sent. [facility] has been waiting on pathology and patient has been told this. Routing to [facility].

## 2020-10-09 NOTE — TELEPHONE ENCOUNTER
Patient has been notified of results and that Talica was being sent to the pharmacy for the patient. Can you please approve pended medications?

## 2020-10-09 NOTE — TELEPHONE ENCOUNTER
Dr. Modi office sent this for her a few hours ago, they have been in communication with her about this. Can you ck to see if it is covered?

## 2020-10-22 ENCOUNTER — OFFICE VISIT (OUTPATIENT)
Dept: GASTROENTEROLOGY | Facility: CLINIC | Age: 55
End: 2020-10-22

## 2020-10-22 VITALS
WEIGHT: 109 LBS | DIASTOLIC BLOOD PRESSURE: 75 MMHG | BODY MASS INDEX: 20.58 KG/M2 | HEART RATE: 75 BPM | HEIGHT: 61 IN | OXYGEN SATURATION: 99 % | SYSTOLIC BLOOD PRESSURE: 133 MMHG | TEMPERATURE: 99.4 F

## 2020-10-22 DIAGNOSIS — K26.9 DUODENAL ULCER: Primary | ICD-10-CM

## 2020-10-22 DIAGNOSIS — K21.9 GASTROESOPHAGEAL REFLUX DISEASE, UNSPECIFIED WHETHER ESOPHAGITIS PRESENT: ICD-10-CM

## 2020-10-22 DIAGNOSIS — A04.8 H. PYLORI INFECTION: ICD-10-CM

## 2020-10-22 DIAGNOSIS — R10.11 RUQ ABDOMINAL PAIN: ICD-10-CM

## 2020-10-22 PROCEDURE — 99214 OFFICE O/P EST MOD 30 MIN: CPT | Performed by: PHYSICIAN ASSISTANT

## 2020-10-22 RX ORDER — PANTOPRAZOLE SODIUM 40 MG/1
40 TABLET, DELAYED RELEASE ORAL
Qty: 60 TABLET | Refills: 5
Start: 2020-10-22 | End: 2023-03-03 | Stop reason: ALTCHOICE

## 2020-10-22 NOTE — PROGRESS NOTES
: 1965    Chief Complaint   Patient presents with   • Nausea   • Abdominal Pain       Lolita Ng is a 55 y.o. female who presents to the office today as a follow up appointment regarding Nausea and Abdominal Pain.    History of Present Illness:  Her last visit, she had EGD as recommended.  She would like to discuss those results today.  She has been taking Talicia for treatment of H. Pylori and will complete this therapy in the next 4 days. She has noticed improvements in abdominal pain and nausea but these have not resolved completely.  She is looking forward to knowing if this medication has cured the H. pylori infection.  He has had 2 treatments in the past for this without resolution.  Prior to starting H. pylori therapy, she was taking Protonix 40 mg twice daily and Carafate.  She was unable to continue the Carafate due to constipation side effect.    Review of Systems   Constitutional: Positive for activity change, appetite change, fatigue and unexpected weight change. Negative for chills and fever.   HENT: Positive for trouble swallowing.    Eyes: Negative.    Respiratory: Negative for cough, choking, chest tightness and shortness of breath.    Cardiovascular: Negative for chest pain.   Gastrointestinal: Positive for abdominal pain and nausea. Negative for abdominal distention, anal bleeding, blood in stool, constipation, diarrhea and vomiting.   Endocrine: Negative.    Genitourinary: Negative for difficulty urinating.   Musculoskeletal: Positive for back pain. Negative for neck pain.   Skin: Negative for color change, pallor, rash and wound.   Allergic/Immunologic: Negative for environmental allergies and food allergies.   Neurological: Positive for dizziness, light-headedness and headaches.   Hematological: Does not bruise/bleed easily.   Psychiatric/Behavioral: Negative.      I have reviewed and confirmed the accuracy of the ROS as documented by the MA/LPN/RN Juanita Alejo PA-C    Past Medical  "History:   Diagnosis Date   • GERD (gastroesophageal reflux disease)    • H. pylori infection        Past Surgical History:   Procedure Laterality Date   • BREAST IMPLANT REMOVAL     • BREAST IMPLANT SURGERY     • CHOLECYSTECTOMY     • ENDOSCOPY N/A 10/5/2020    Procedure: ESOPHAGOGASTRODUODENOSCOPY WITH BIOPSY CPT CODE: 87239;  Surgeon: Luca Modi MD;  Location: [facility];  Service: Gastroenterology;  Laterality: N/A;   • TUBAL ABDOMINAL LIGATION         Family History   Problem Relation Age of Onset   • Ovarian cancer Mother        Social History     Socioeconomic History   • Marital status:      Spouse name: Not on file   • Number of children: Not on file   • Years of education: Not on file   • Highest education level: Not on file   Tobacco Use   • Smoking status: Current Every Day Smoker     Packs/day: 0.50     Years: 20.00     Pack years: 10.00   • Smokeless tobacco: Never Used   Substance and Sexual Activity   • Alcohol use: Never     Frequency: Never   • Drug use: Never   • Sexual activity: Defer       Current Outpatient Medications:   •  Amoxicill-Rifabutin-Omeprazole (Talicia) 250-12.5-10 MG capsule delayed-release, Take 4 capsules by mouth Daily for 14 days., Disp: 168 capsule, Rfl: 0  •  Thiamine HCl (vitamin B-1) 50 MG tablet, Take 50 mg by mouth Daily., Disp: , Rfl:     Allergies:   Codeine    Vitals:  /75   Pulse 75   Temp 99.4 °F (37.4 °C)   Ht 154.9 cm (61\")   Wt 49.4 kg (109 lb)   SpO2 99%   BMI 20.60 kg/m²     Physical Exam  Vitals signs reviewed.   Constitutional:       General: She is not in acute distress.     Appearance: Normal appearance. She is well-developed. She is not ill-appearing or diaphoretic.   HENT:      Head: Normocephalic and atraumatic.      Right Ear: External ear normal.      Left Ear: External ear normal.      Nose: Nose normal.      Mouth/Throat:      Comments: Wearing a mask  Eyes:      General: No scleral icterus.        Right eye: No " discharge.         Left eye: No discharge.      Conjunctiva/sclera: Conjunctivae normal.   Neck:      Musculoskeletal: Normal range of motion.      Vascular: No JVD.   Cardiovascular:      Rate and Rhythm: Normal rate and regular rhythm.      Heart sounds: Normal heart sounds. No murmur. No friction rub. No gallop.    Pulmonary:      Effort: Pulmonary effort is normal. No respiratory distress.      Breath sounds: Normal breath sounds. No wheezing or rales.   Chest:      Chest wall: No tenderness.   Abdominal:      General: Bowel sounds are normal. There is no distension.      Palpations: Abdomen is soft. There is no mass.      Tenderness: There is abdominal tenderness (mild, RUQ). There is no guarding.   Musculoskeletal: Normal range of motion.         General: No deformity.   Skin:     General: Skin is warm and dry.      Findings: No erythema or rash.   Neurological:      Mental Status: She is alert and oriented to person, place, and time.      Coordination: Coordination normal.   Psychiatric:         Behavior: Behavior normal.         Thought Content: Thought content normal.         Judgment: Judgment normal.      Comments: Anxious affect     Results Review:  EGD was completed on 10/5/2020 by Dr. Modi.  Findings were normal esophagus, irregular Z-line, normal stomach except for small sliding hiatal hernia, clean base 10 mm duodenal bulb ulcer other than normal duodenum.    Assessment:  1. Duodenal ulcer    2. H. pylori infection    3. Gastroesophageal reflux disease, unspecified whether esophagitis present    4. RUQ abdominal pain      Plan:  Orders Placed This Encounter   Procedures   • Follow Anesthesia Guidelines / Standing Orders   • Obtain Informed Consent     ESOPHAGOGASTRODUODENOSCOPY WITH BIOPSY CPT CODE: 48166 (N/A)  She will need an esophagogastroduodenoscopy performed with IV general sedation. All of the risks, benefits and alternatives of this procedure have been discussed with her, all of her  questions have been answered and she has elected to proceed. She should follow up in the office after this procedure to discuss the results and further recommendations can be made at that time.    New Medications Ordered This Visit   Medications   • pantoprazole (Protonix) 40 MG EC tablet     Sig: Take 1 tablet by mouth 2 (Two) Times a Day Before Meals.     Dispense:  60 tablet     Refill:  5     She was instructed not to lie down immediately after eating (wait at least 3 hours after meals), elevate the head of the bed at night, avoid spicy foods, avoid mints, avoid caffeine, avoid nicotine and maintain a healthy weight. She will start taking pantoprazole 40 mg twice daily 30 minutes before meals for treatment of GERD after finishing H pylori therapy.     Considered breath test for confirmation but decided against it due to needing PPI therapy for treatment of ulcer for now. Will check again for H pylori at time of EGD which will be scheduled appox 8 weeks from last EGD to re-check ulcer.        Return for follow up after procedure to discuss results.      Electronically signed 10/22/2020 17:11 MARIA ISABELT  Juanita Alejo PA-C  [facility]

## 2020-11-19 ENCOUNTER — TELEPHONE (OUTPATIENT)
Dept: GASTROENTEROLOGY | Facility: CLINIC | Age: 55
End: 2020-11-19

## 2020-11-19 NOTE — TELEPHONE ENCOUNTER
----- Message from Juanita Alejo PA-C sent at 11/19/2020 11:22 AM EST -----  Regarding: RE: EGD F/U  Sure, I can take care of this.   ----- Message -----  From: Rosanna Zamora MA  Sent: 11/18/2020  12:53 PM EST  To: Juanita Alejo PA-C  Subject: EGD F/U                                          Can you please make appt for patient to repeat EGD around Dec 5th?

## 2020-11-19 NOTE — TELEPHONE ENCOUNTER
Juanita, patient canceled procedure due to stating she has some autoimmune disease and she has to take care of first but, she is having some digestive and bowel problems and I made her a telephone visit with you in the morning. She wants to discuss with you.

## 2020-11-19 NOTE — TELEPHONE ENCOUNTER
Can you call pt to see why she canceled this repeat EGD that was already scheduled. Then see if she will r/s for around the first of Dec. Thanks.

## 2020-11-20 ENCOUNTER — OFFICE VISIT (OUTPATIENT)
Dept: GASTROENTEROLOGY | Facility: CLINIC | Age: 55
End: 2020-11-20

## 2020-11-20 VITALS — BODY MASS INDEX: 19.46 KG/M2 | WEIGHT: 103 LBS

## 2020-11-20 DIAGNOSIS — K26.9 DUODENAL ULCER: Primary | ICD-10-CM

## 2020-11-20 DIAGNOSIS — A04.8 H. PYLORI INFECTION: ICD-10-CM

## 2020-11-20 PROCEDURE — 99441 PR PHYS/QHP TELEPHONE EVALUATION 5-10 MIN: CPT | Performed by: PHYSICIAN ASSISTANT

## 2020-11-20 NOTE — PROGRESS NOTES
: 1965    Chief Complaint   Patient presents with   • Diarrhea   • Nausea     You have chosen to receive care through a telephone visit. Do you consent to use a telephone visit for your medical care today? Yes    Lolita Ng is a 55 y.o. female who presents to the office today as a follow up appointment regarding Diarrhea and Nausea.     History of Present Illness:  She canceled her upcoming EGD for recheck of duodenal ulcer.  She is no longer taking her Protonix 40 mg twice daily as directed for treatment of her ulcer, stopped on her own several weeks ago because she felt like it was not helping.  She recently had positive JAMES with labs from her PCP and is concerned she could have an autoimmune disease.  She is still having all the same GI symptoms including bloating, abdominal pain and diarrhea which is dark in color.  She states that she canceled her upcoming EGD because she feels weak and wanted to investigate for autoimmune diseases such as Crohn's disease.  She also has history of H. pylori infection which was not confirmed eradicated after last treatment.    Review of Systems   Constitutional: Positive for activity change, appetite change, fatigue and unexpected weight change. Negative for chills and fever.   HENT: Positive for trouble swallowing.    Eyes: Negative.    Respiratory: Negative for cough, choking, chest tightness and shortness of breath.    Cardiovascular: Negative for chest pain.   Gastrointestinal: Positive for abdominal distention, abdominal pain, diarrhea and nausea. Negative for anal bleeding, blood in stool, constipation, rectal pain and vomiting.   Endocrine: Negative.    Genitourinary: Negative for difficulty urinating.   Musculoskeletal: Positive for back pain. Negative for neck pain.   Skin: Negative for color change, pallor, rash and wound.   Allergic/Immunologic: Negative for environmental allergies and food allergies.   Neurological: Positive for dizziness,  light-headedness and headaches.   Hematological: Does not bruise/bleed easily.   Psychiatric/Behavioral: Negative.      I have reviewed and confirmed the accuracy of the ROS as documented by the MA/LPN/RN Juanita Alejo PA-C    Past Medical History:   Diagnosis Date   • GERD (gastroesophageal reflux disease)    • H. pylori infection        Past Surgical History:   Procedure Laterality Date   • BREAST IMPLANT REMOVAL     • BREAST IMPLANT SURGERY     • CHOLECYSTECTOMY     • ENDOSCOPY N/A 10/5/2020    Procedure: ESOPHAGOGASTRODUODENOSCOPY WITH BIOPSY CPT CODE: 13261;  Surgeon: Luca Modi MD;  Location: [facility];  Service: Gastroenterology;  Laterality: N/A;   • TUBAL ABDOMINAL LIGATION         Family History   Problem Relation Age of Onset   • Ovarian cancer Mother        Social History     Socioeconomic History   • Marital status:      Spouse name: Not on file   • Number of children: Not on file   • Years of education: Not on file   • Highest education level: Not on file   Tobacco Use   • Smoking status: Current Every Day Smoker     Packs/day: 0.50     Years: 20.00     Pack years: 10.00   • Smokeless tobacco: Never Used   Substance and Sexual Activity   • Alcohol use: Never     Frequency: Never   • Drug use: Never   • Sexual activity: Defer       Current Outpatient Medications:   NONE    Allergies:   Codeine    Vitals:  Wt 46.7 kg (103 lb)   BMI 19.46 kg/m² - telephone visit    Physical Exam  HENT:      Head:      Comments: Voice normal  Pulmonary:      Effort: No respiratory distress.   Neurological:      Mental Status: She is alert and oriented to person, place, and time.   Psychiatric:         Thought Content: Thought content normal.         Judgment: Judgment normal.     Results Review:  EGD was completed on 10/5/2020 by Dr. Modi.  Findings were normal esophagus, irregular Z-line, normal stomach except for small sliding hiatal hernia, clean base 10 mm duodenal bulb ulcer other  than normal duodenum.    Assessment:  1. Duodenal ulcer    2. H. pylori infection      Plan:  I have encouraged her to have repeat EGD for evaluation of previous duodenal ulcer which is likely contributing to her continued GI symptoms.  I also encouraged her to restart Protonix 40 mg twice daily as treatment for her duodenal ulcer.  JAMES that was recently positive could be due to inflammation because it is an acute phase reactant.  She should further evaluate and treat her current/known GI problem.     This visit has been rescheduled as a phone visit to comply with patient safety concerns in accordance with CDC recommendations. Total time of discussion was 6 minutes.    Return for follow up after procedure to discuss results.      Electronically signed 11/20/2020 12:30 TAMEKA Alejo PA-C  [facility]

## 2021-07-30 ENCOUNTER — TELEPHONE (OUTPATIENT)
Dept: OBSTETRICS AND GYNECOLOGY | Facility: CLINIC | Age: 56
End: 2021-07-30

## 2022-09-14 ENCOUNTER — OFFICE VISIT (OUTPATIENT)
Dept: ENDOCRINOLOGY | Facility: CLINIC | Age: 57
End: 2022-09-14

## 2022-09-14 ENCOUNTER — LAB (OUTPATIENT)
Dept: LAB | Facility: HOSPITAL | Age: 57
End: 2022-09-14

## 2022-09-14 VITALS
BODY MASS INDEX: 20.16 KG/M2 | SYSTOLIC BLOOD PRESSURE: 118 MMHG | WEIGHT: 106.8 LBS | OXYGEN SATURATION: 99 % | HEIGHT: 61 IN | HEART RATE: 68 BPM | DIASTOLIC BLOOD PRESSURE: 78 MMHG

## 2022-09-14 DIAGNOSIS — R63.4 WEIGHT LOSS: Primary | ICD-10-CM

## 2022-09-14 LAB
CORTIS SERPL-MCNC: 7.93 MCG/DL
CRP SERPL-MCNC: <0.3 MG/DL (ref 0–0.5)
ERYTHROCYTE [SEDIMENTATION RATE] IN BLOOD: 11 MM/HR (ref 0–30)

## 2022-09-14 PROCEDURE — 85652 RBC SED RATE AUTOMATED: CPT | Performed by: NURSE PRACTITIONER

## 2022-09-14 PROCEDURE — 99203 OFFICE O/P NEW LOW 30 MIN: CPT | Performed by: NURSE PRACTITIONER

## 2022-09-14 PROCEDURE — 86140 C-REACTIVE PROTEIN: CPT | Performed by: NURSE PRACTITIONER

## 2022-09-14 PROCEDURE — 82533 TOTAL CORTISOL: CPT | Performed by: NURSE PRACTITIONER

## 2022-09-14 PROCEDURE — 36415 COLL VENOUS BLD VENIPUNCTURE: CPT | Performed by: NURSE PRACTITIONER

## 2022-09-14 PROCEDURE — 84445 ASSAY OF TSI GLOBULIN: CPT | Performed by: NURSE PRACTITIONER

## 2022-09-14 PROCEDURE — 83520 IMMUNOASSAY QUANT NOS NONAB: CPT | Performed by: NURSE PRACTITIONER

## 2022-09-14 RX ORDER — CLONAZEPAM 0.5 MG/1
0.5 TABLET ORAL 2 TIMES DAILY PRN
COMMUNITY

## 2022-09-14 RX ORDER — SERTRALINE HYDROCHLORIDE 25 MG/1
25 TABLET, FILM COATED ORAL DAILY
COMMUNITY

## 2022-09-14 NOTE — PROGRESS NOTES
Chief Complaint   Patient presents with   • Thyroid Problem     Tremors, weight loss, sweating a lot. She gets up every morning gagging. Has a choking feeling         Referring Provider  [place] Alexander, MARIA EUGENIA*     HPI   Lolita Ng is a 57 y.o. female had concerns including Thyroid Problem (Tremors, weight loss, sweating a lot. She gets up every morning gagging. Has a choking feeling ).   Seen as a new patient.    She wakes up every morning gagging and feels like she is choking.  Started again in April. She had these issues in 2020 and had low TSH levels then.     She has not had COVID or been sick recently.  She had shingles in March and since then has had increased symptoms and continue to worsen.  2015 she had her breast implants due to increased symptoms and they thought it was related to those so they removed those.  She was placed on Cortef 15 mg QD then and it helped with her symptoms then.  She was on this for 6 months, she was good for some times and then when she got sick in 2020 all of her symptoms have gotten worse again.  She has had some issues with hypotension, difficulty sleeping.      She was referred here by PCP for thyroid nodule.  Most recent TFT's (7/2022) were in range.  US Thyroid 5/2022 Impression: No significant abnormality.  No solid lesion. Punctate cyst on the left measuring 0.2 cm.    Birth state: [place]  Previous history of radiation to face/neck: none  Consuming foods high in iodine: none  Family history of thyroid complications: sister-hyperthyroidism, sister-hypothyroid, no hx of thyroid cancer    The following portions of the patient's history were reviewed and updated as appropriate: allergies, current medications, past family history, past medical history, past social history, past surgical history and problem list.      Past Medical History:   Diagnosis Date   • GERD (gastroesophageal reflux disease)    • H. pylori infection      Past Surgical History:   Procedure Laterality  Date   • BREAST IMPLANT REMOVAL     • BREAST IMPLANT SURGERY     • CHOLECYSTECTOMY     • ENDOSCOPY N/A 10/5/2020    Procedure: ESOPHAGOGASTRODUODENOSCOPY WITH BIOPSY CPT CODE: 80229;  Surgeon: Luca Modi MD;  Location: [facility];  Service: Gastroenterology;  Laterality: N/A;   • TUBAL ABDOMINAL LIGATION        Family History   Problem Relation Age of Onset   • Ovarian cancer Mother       Social History     Socioeconomic History   • Marital status:    Tobacco Use   • Smoking status: Current Every Day Smoker     Packs/day: 0.50     Years: 20.00     Pack years: 10.00   • Smokeless tobacco: Never Used   Vaping Use   • Vaping Use: Never used   Substance and Sexual Activity   • Alcohol use: Never   • Drug use: Never   • Sexual activity: Defer      Allergies   Allergen Reactions   • Codeine Nausea And Vomiting      Current Outpatient Medications on File Prior to Visit   Medication Sig Dispense Refill   • clonazePAM (KlonoPIN) 0.5 MG tablet Take 0.5 mg by mouth 2 (Two) Times a Day As Needed.     • sertraline (ZOLOFT) 25 MG tablet Take 25 mg by mouth Daily.     • pantoprazole (Protonix) 40 MG EC tablet Take 1 tablet by mouth 2 (Two) Times a Day Before Meals. 60 tablet 5   • Thiamine HCl (vitamin B-1) 50 MG tablet Take 50 mg by mouth Daily.       No current facility-administered medications on file prior to visit.      Review of Systems   Constitutional: Positive for diaphoresis and unexpected weight loss.        Hyperpigmentation to skin, specifically to her neck.   HENT: Positive for trouble swallowing.    Eyes: Negative.    Gastrointestinal: Positive for diarrhea.   Endocrine: Positive for heat intolerance.   Neurological: Positive for tremors.   Psychiatric/Behavioral: Positive for agitation and sleep disturbance. The patient is nervous/anxious.    All other systems reviewed and are negative.     /78 (BP Location: Left arm, Patient Position: Sitting, Cuff Size: Adult)   Pulse 68   Ht  "154.9 cm (61\")   Wt 48.4 kg (106 lb 12.8 oz)   SpO2 99%   BMI 20.18 kg/m²      Physical Exam  Vitals reviewed.   Constitutional:       Appearance: Normal appearance.      Comments: Mild hyperpigmentation noted to the skin.   Eyes:      Extraocular Movements: Extraocular movements intact.   Neck:      Thyroid: Thyromegaly present. No thyroid mass or thyroid tenderness.   Cardiovascular:      Rate and Rhythm: Normal rate and regular rhythm.      Pulses: Normal pulses.      Heart sounds: Normal heart sounds.   Pulmonary:      Effort: Pulmonary effort is normal.      Breath sounds: Normal breath sounds.   Skin:     General: Skin is warm and dry.   Neurological:      Mental Status: She is alert and oriented to person, place, and time.   Psychiatric:         Mood and Affect: Mood normal.         Behavior: Behavior normal.         Thought Content: Thought content normal.         Judgment: Judgment normal.       Labs/Imaging  CMP  Lab Results   Component Value Date    GLUCOSE 90 09/28/2020    BUN 11 09/28/2020    CREATININE 0.78 09/28/2020    EGFRIFNONA 76 09/28/2020    BCR 14.1 09/28/2020    K 4.4 09/28/2020    CO2 24.3 09/28/2020    CALCIUM 9.6 09/28/2020    ALBUMIN 4.60 09/28/2020    AST 16 09/28/2020    ALT 11 09/28/2020        CBC w/DIFF   Lab Results   Component Value Date    WBC 12.28 (H) 09/28/2020    RBC 4.34 09/28/2020    HGB 13.9 09/28/2020    HCT 41.4 09/28/2020    MCV 95.4 09/28/2020    MCH 32.0 09/28/2020    MCHC 33.6 09/28/2020    RDW 12.9 09/28/2020    RDWSD 45.4 09/28/2020    MPV 10.2 09/28/2020     09/28/2020    NEUTRORELPCT 63.5 09/28/2020    LYMPHORELPCT 31.7 09/28/2020    MONORELPCT 4.0 (L) 09/28/2020    EOSRELPCT 0.3 09/28/2020    BASORELPCT 0.3 09/28/2020    AUTOIGPER 0.2 09/28/2020    NEUTROABS 7.80 (H) 09/28/2020    LYMPHSABS 3.89 (H) 09/28/2020    MONOSABS 0.49 09/28/2020    EOSABS 0.04 09/28/2020    BASOSABS 0.04 09/28/2020    AUTOIGNUM 0.02 09/28/2020    NRBC 0.0 09/28/2020 "       TSH  No results found for: TSH    T4  No results found for: FREET4  No results found for: B8SNHDQ    T3  No results found for: T3FREE  No results found for: O7XSSLC    TRAb  No results found for: TSURCPAB    TPO  No results found for: THYROIDAB    No valid procedures specified.    Assessment and Plan    Diagnoses and all orders for this visit:    1. Weight loss (Primary)  Assessment & Plan:  Patient's symptoms could be related to an underlying thyroid issue.  Will obtain various labs to determine underlying cause of her symptoms and treat and follow as indicated.  Follow-up in 3 months.    Orders:  -     Thyroid Stimulating Immunoglobulin  -     Thyrotropin Receptor Antibody  -     Sedimentation Rate  -     C-reactive Protein  -     Cortisol       Return in about 3 months (around 12/14/2022) for Follow-up appointment. The patient was instructed to contact the clinic with any interval questions or concerns.      This document has been electronically signed by AISLINN Mathew  September 14, 2022 13:21 EDT   Endocrinology

## 2022-09-14 NOTE — ASSESSMENT & PLAN NOTE
Patient's symptoms could be related to an underlying thyroid issue.  Will obtain various labs to determine underlying cause of her symptoms and treat and follow as indicated.  Follow-up in 3 months.

## 2022-09-16 LAB — TSH RECEP AB SER-ACNC: <1.1 IU/L (ref 0–1.75)

## 2022-09-18 LAB — TSI SER-ACNC: <0.1 IU/L (ref 0–0.55)

## 2023-01-31 ENCOUNTER — LAB (OUTPATIENT)
Dept: LAB | Facility: HOSPITAL | Age: 58
End: 2023-01-31
Payer: COMMERCIAL

## 2023-01-31 ENCOUNTER — OFFICE VISIT (OUTPATIENT)
Dept: ENDOCRINOLOGY | Facility: CLINIC | Age: 58
End: 2023-01-31
Payer: COMMERCIAL

## 2023-01-31 VITALS
OXYGEN SATURATION: 96 % | WEIGHT: 113 LBS | DIASTOLIC BLOOD PRESSURE: 68 MMHG | BODY MASS INDEX: 21.34 KG/M2 | SYSTOLIC BLOOD PRESSURE: 122 MMHG | HEIGHT: 61 IN | HEART RATE: 82 BPM

## 2023-01-31 DIAGNOSIS — R51.9 NONINTRACTABLE HEADACHE, UNSPECIFIED CHRONICITY PATTERN, UNSPECIFIED HEADACHE TYPE: ICD-10-CM

## 2023-01-31 DIAGNOSIS — R63.4 WEIGHT LOSS: Primary | ICD-10-CM

## 2023-01-31 LAB
T4 FREE SERPL-MCNC: 1.21 NG/DL (ref 0.93–1.7)
TSH SERPL DL<=0.05 MIU/L-ACNC: 0.83 UIU/ML (ref 0.27–4.2)

## 2023-01-31 PROCEDURE — 84146 ASSAY OF PROLACTIN: CPT | Performed by: NURSE PRACTITIONER

## 2023-01-31 PROCEDURE — 84481 FREE ASSAY (FT-3): CPT | Performed by: NURSE PRACTITIONER

## 2023-01-31 PROCEDURE — 84439 ASSAY OF FREE THYROXINE: CPT | Performed by: NURSE PRACTITIONER

## 2023-01-31 PROCEDURE — 84443 ASSAY THYROID STIM HORMONE: CPT | Performed by: NURSE PRACTITIONER

## 2023-01-31 PROCEDURE — 36415 COLL VENOUS BLD VENIPUNCTURE: CPT | Performed by: NURSE PRACTITIONER

## 2023-01-31 PROCEDURE — 99214 OFFICE O/P EST MOD 30 MIN: CPT | Performed by: NURSE PRACTITIONER

## 2023-01-31 NOTE — ASSESSMENT & PLAN NOTE
-Clinically euthyroid.  -Will obtain TFT's today with medication initiation as indicated.  -Follow-up in 6 months.

## 2023-01-31 NOTE — ASSESSMENT & PLAN NOTE
Will obtain labs to determine if underlying cause present and treat and follow as indicated.  Follow-up in 6 months.

## 2023-01-31 NOTE — PROGRESS NOTES
Chief Complaint   Patient presents with   • Thyroid Problem     Follow up. Pt stated she is still very tired.         Lolita Ng is a 57 y.o. female had concerns including Thyroid Problem (Follow up. Pt stated she is still very tired. ).   Thyroid Problem.    She has had improved symptoms since her last visit with choking/difficulty swallowing.  She has had increased symptoms including feeling like a band around her head with increased headaches.      History:  She wakes up every morning gagging and feels like she is choking.  Started again in April. She had these issues in 2020 and had low TSH levels then.     She has not had COVID or been sick recently.  She had shingles in March and since then has had increased symptoms and continue to worsen.  2015 she had her breast implants due to increased symptoms and they thought it was related to those so they removed those.  She was placed on Cortef 15 mg QD then and it helped with her symptoms then.  She was on this for 6 months, she was good for some times and then when she got sick in 2020 all of her symptoms have gotten worse again.  She has had some issues with hypotension, difficulty sleeping.      She was referred here by PCP for thyroid nodule.  Most recent TFT's (7/2022) were in range.  US Thyroid 5/2022 Impression: No significant abnormality.  No solid lesion. Punctate cyst on the left measuring 0.2 cm.    Birth state: [place]  Previous history of radiation to face/neck: none  Consuming foods high in iodine: none  Family history of thyroid complications: sister-hyperthyroidism, sister-hypothyroid, no hx of thyroid cancer    The following portions of the patient's history were reviewed and updated as appropriate: allergies, current medications, past family history, past medical history, past social history, past surgical history and problem list.      Past Medical History:   Diagnosis Date   • GERD (gastroesophageal reflux disease)    • H. pylori infection       Past Surgical History:   Procedure Laterality Date   • BREAST IMPLANT REMOVAL     • BREAST IMPLANT SURGERY     • CHOLECYSTECTOMY     • ENDOSCOPY N/A 10/5/2020    Procedure: ESOPHAGOGASTRODUODENOSCOPY WITH BIOPSY CPT CODE: 90746;  Surgeon: Luac Modi MD;  Location: [facility];  Service: Gastroenterology;  Laterality: N/A;   • TUBAL ABDOMINAL LIGATION        Family History   Problem Relation Age of Onset   • Ovarian cancer Mother       Social History     Socioeconomic History   • Marital status:    Tobacco Use   • Smoking status: Every Day     Packs/day: 0.50     Years: 20.00     Pack years: 10.00     Types: Cigarettes   • Smokeless tobacco: Never   Vaping Use   • Vaping Use: Never used   Substance and Sexual Activity   • Alcohol use: Never   • Drug use: Never   • Sexual activity: Defer      Allergies   Allergen Reactions   • Codeine Nausea And Vomiting      Current Outpatient Medications on File Prior to Visit   Medication Sig Dispense Refill   • clonazePAM (KlonoPIN) 0.5 MG tablet Take 0.5 mg by mouth 2 (Two) Times a Day As Needed.     • sertraline (ZOLOFT) 25 MG tablet Take 25 mg by mouth Daily.     • pantoprazole (Protonix) 40 MG EC tablet Take 1 tablet by mouth 2 (Two) Times a Day Before Meals. 60 tablet 5   • Thiamine HCl (vitamin B-1) 50 MG tablet Take 50 mg by mouth Daily.       No current facility-administered medications on file prior to visit.      Review of Systems   Constitutional: Positive for diaphoresis and unexpected weight loss.        Hyperpigmentation to skin, specifically to her neck.   HENT: Positive for trouble swallowing.    Eyes: Negative.    Gastrointestinal: Positive for diarrhea.   Endocrine: Positive for heat intolerance.   Neurological: Positive for tremors.   Psychiatric/Behavioral: Positive for agitation and sleep disturbance. The patient is nervous/anxious.    All other systems reviewed and are negative.     /68 (BP Location: Left arm, Patient  "Position: Sitting, Cuff Size: Adult)   Pulse 82   Ht 154.9 cm (61\")   Wt 51.3 kg (113 lb)   SpO2 96%   BMI 21.35 kg/m²      Physical Exam  Vitals reviewed.   Constitutional:       Appearance: Normal appearance.      Comments: Mild hyperpigmentation noted to the skin.   Eyes:      Extraocular Movements: Extraocular movements intact.   Neck:      Thyroid: Thyromegaly present. No thyroid mass or thyroid tenderness.   Cardiovascular:      Rate and Rhythm: Normal rate and regular rhythm.      Pulses: Normal pulses.      Heart sounds: Normal heart sounds.   Pulmonary:      Effort: Pulmonary effort is normal.      Breath sounds: Normal breath sounds.   Skin:     General: Skin is warm and dry.   Neurological:      Mental Status: She is alert and oriented to person, place, and time.   Psychiatric:         Mood and Affect: Mood normal.         Behavior: Behavior normal.         Thought Content: Thought content normal.         Judgment: Judgment normal.       Labs/Imaging  CMP  Lab Results   Component Value Date    GLUCOSE 90 09/28/2020    BUN 11 09/28/2020    CREATININE 0.78 09/28/2020    EGFRIFNONA 76 09/28/2020    BCR 14.1 09/28/2020    K 4.4 09/28/2020    CO2 24.3 09/28/2020    CALCIUM 9.6 09/28/2020    ALBUMIN 4.60 09/28/2020    AST 16 09/28/2020    ALT 11 09/28/2020        CBC w/DIFF   Lab Results   Component Value Date    WBC 12.28 (H) 09/28/2020    RBC 4.34 09/28/2020    HGB 13.9 09/28/2020    HCT 41.4 09/28/2020    MCV 95.4 09/28/2020    MCH 32.0 09/28/2020    MCHC 33.6 09/28/2020    RDW 12.9 09/28/2020    RDWSD 45.4 09/28/2020    MPV 10.2 09/28/2020     09/28/2020    NEUTRORELPCT 63.5 09/28/2020    LYMPHORELPCT 31.7 09/28/2020    MONORELPCT 4.0 (L) 09/28/2020    EOSRELPCT 0.3 09/28/2020    BASORELPCT 0.3 09/28/2020    AUTOIGPER 0.2 09/28/2020    NEUTROABS 7.80 (H) 09/28/2020    LYMPHSABS 3.89 (H) 09/28/2020    MONOSABS 0.49 09/28/2020    EOSABS 0.04 09/28/2020    BASOSABS 0.04 09/28/2020    AUTOIGNUM 0.02 " 09/28/2020    NRBC 0.0 09/28/2020       TSH  Lab Results   Component Value Date    TSH 0.830 01/31/2023       T4  Lab Results   Component Value Date    FREET4 1.21 01/31/2023     No results found for: T5ICBME    T3  No results found for: T3FREE  No results found for: X2NIGQE    TRAb  Lab Results   Component Value Date    TSURCPAB <1.10 09/14/2022       TPO  No results found for: THYROIDAB    No valid procedures specified.    Assessment and Plan    Diagnoses and all orders for this visit:    1. Weight loss (Primary)  Assessment & Plan:  -Clinically euthyroid.  -Will obtain TFT's today with medication initiation as indicated.  -Follow-up in 6 months.    Orders:  -     TSH  -     T4, Free  -     T3, Free    2. Nonintractable headache, unspecified chronicity pattern, unspecified headache type  Assessment & Plan:  Will obtain labs to determine if underlying cause present and treat and follow as indicated.  Follow-up in 6 months.    Orders:  -     Prolactin       Return in about 6 months (around 7/31/2023) for Follow-up appointment. The patient was instructed to contact the clinic with any interval questions or concerns.      This document has been electronically signed by AISLINN Mathew  January 31, 2023 15:55 EST   Endocrinology

## 2023-02-01 LAB
PROLACTIN SERPL-MCNC: 8.01 NG/ML (ref 4.79–23.3)
T3FREE SERPL-MCNC: 3.03 PG/ML (ref 2–4.4)

## 2023-03-01 DIAGNOSIS — M25.531 RIGHT WRIST PAIN: Primary | ICD-10-CM

## 2023-03-03 ENCOUNTER — HOSPITAL ENCOUNTER (OUTPATIENT)
Dept: GENERAL RADIOLOGY | Facility: HOSPITAL | Age: 58
Discharge: HOME | End: 2023-03-03
Admitting: PHYSICIAN ASSISTANT
Payer: COMMERCIAL

## 2023-03-03 ENCOUNTER — OFFICE VISIT (OUTPATIENT)
Dept: ORTHOPEDIC SURGERY | Facility: CLINIC | Age: 58
End: 2023-03-03
Payer: COMMERCIAL

## 2023-03-03 VITALS
HEIGHT: 61 IN | HEART RATE: 58 BPM | WEIGHT: 110 LBS | BODY MASS INDEX: 20.77 KG/M2 | DIASTOLIC BLOOD PRESSURE: 71 MMHG | SYSTOLIC BLOOD PRESSURE: 133 MMHG

## 2023-03-03 DIAGNOSIS — M25.531 RIGHT WRIST PAIN: Primary | ICD-10-CM

## 2023-03-03 DIAGNOSIS — M79.89 MASS OF SOFT TISSUE: ICD-10-CM

## 2023-03-03 DIAGNOSIS — M25.531 RIGHT WRIST PAIN: ICD-10-CM

## 2023-03-03 PROCEDURE — 73110 X-RAY EXAM OF WRIST: CPT

## 2023-03-03 PROCEDURE — 99203 OFFICE O/P NEW LOW 30 MIN: CPT | Performed by: PHYSICIAN ASSISTANT

## 2023-03-03 PROCEDURE — 73110 X-RAY EXAM OF WRIST: CPT | Performed by: RADIOLOGY

## 2023-03-03 NOTE — PROGRESS NOTES
[facility] [place] [facility] Patient Visit          Patient: Lolita Ng  YOB: 1965  Date of Encounter: 03/03/2023  PCP: Albania Alexander APRN      Subjective     Chief Complaint   Patient presents with   • Right Wrist - Initial Evaluation, Pain           History of Present Illness:     Lolita Ng is a 57 y.o. female presents today secondary to a 1 month history of abrupt onset swelling and pain to the volar aspect of the right wrist.  Patient reports that this has increased over size and there is now soft tissue mass grape sized along the volar aspect of the wrist.  Patient reports she has difficulty with repetitive activities or any lifting.  She has undergone no conservative treatment in reference to this thus far.  She denies any skin opening or drainage.        Patient Active Problem List   Diagnosis   • Weight loss   • Nonintractable headache     Past Medical History:   Diagnosis Date   • GERD (gastroesophageal reflux disease)    • H. pylori infection      Past Surgical History:   Procedure Laterality Date   • BREAST IMPLANT REMOVAL     • BREAST IMPLANT SURGERY     • CHOLECYSTECTOMY     • COLONOSCOPY     • ENDOSCOPY N/A 10/05/2020    Procedure: ESOPHAGOGASTRODUODENOSCOPY WITH BIOPSY CPT CODE: 58906;  Surgeon: Luca Modi MD;  Location: [facility];  Service: Gastroenterology;  Laterality: N/A;   • TUBAL ABDOMINAL LIGATION       Social History     Occupational History   • Not on file   Tobacco Use   • Smoking status: Every Day     Packs/day: 0.50     Years: 20.00     Pack years: 10.00     Types: Cigarettes   • Smokeless tobacco: Never   Vaping Use   • Vaping Use: Never used   Substance and Sexual Activity   • Alcohol use: Never   • Drug use: Never   • Sexual activity: Defer    Lolita Ng  reports that she has been smoking cigarettes. She has a 10.00 pack-year smoking history. She has never used smokeless tobacco.. I have educated her on the risk of diseases  "from using tobacco products such as cancer, COPD and heart disease.     I advised her to quit and she is not willing to quit.    I spent 3  minutes counseling the patient.          Social History     Social History Narrative   • Not on file     Family History   Problem Relation Age of Onset   • Ovarian cancer Mother    • Cancer Father      Current Outpatient Medications   Medication Sig Dispense Refill   • clonazePAM (KlonoPIN) 0.5 MG tablet Take 1 tablet by mouth 2 (Two) Times a Day As Needed.     • sertraline (ZOLOFT) 25 MG tablet Take 1 tablet by mouth Daily.       No current facility-administered medications for this visit.     Allergies   Allergen Reactions   • Codeine Nausea And Vomiting            Review of Systems   Constitutional: Negative.   HENT: Negative.    Eyes: Negative.    Cardiovascular: Negative.    Respiratory: Negative.    Endocrine: Negative.    Hematologic/Lymphatic: Negative.    Skin: Negative.    Musculoskeletal:        Pertinent positives listed in HPI   Gastrointestinal: Negative.    Genitourinary: Negative.    Neurological: Negative.    Psychiatric/Behavioral: The patient is nervous/anxious.    Allergic/Immunologic: Negative.          Objective      Vitals:    03/03/23 0952   BP: 133/71   Pulse: 58   Weight: 49.9 kg (110 lb)   Height: 154.9 cm (61\")      BMI is within normal parameters. No other follow-up for BMI required.      Physical Exam  Vitals and nursing note reviewed.   Constitutional:       General: She is not in acute distress.     Appearance: She is not ill-appearing.   HENT:      Head: Normocephalic and atraumatic.      Right Ear: External ear normal.      Left Ear: External ear normal.      Nose: Nose normal. No congestion or rhinorrhea.   Eyes:      Extraocular Movements: Extraocular movements intact.      Conjunctiva/sclera: Conjunctivae normal.      Pupils: Pupils are equal, round, and reactive to light.   Cardiovascular:      Rate and Rhythm: Normal rate.      Pulses: " Normal pulses.   Pulmonary:      Effort: Pulmonary effort is normal. No respiratory distress.      Breath sounds: No stridor.   Abdominal:      General: There is no distension.   Musculoskeletal:      Right wrist: Swelling, deformity, effusion and tenderness present.      Cervical back: Normal range of motion.      Comments: Large grape sized cystic nodule volar radial sided wrist.  This is not freely movable and there is no associated ecchymosis or erythema.  The nodule is pulsatile.   Skin:     General: Skin is warm and dry.      Capillary Refill: Capillary refill takes less than 2 seconds.   Neurological:      General: No focal deficit present.      Mental Status: She is alert and oriented to person, place, and time.   Psychiatric:         Mood and Affect: Mood normal.         Behavior: Behavior normal.         Thought Content: Thought content normal.         Judgment: Judgment normal.                 Radiology:      XR Wrist 3+ View Right    Result Date: 3/3/2023  1.  Soft tissue density along the radial aspect of the wrist soft tissues superficial in location and may represent subcutaneous cyst or possible ganglion cyst. 2.  No fracture or dislocation.  This report was finalized on 3/3/2023 9:49 AM by Dr. Lino Osborne MD.              Assessment/Plan        ICD-10-CM ICD-9-CM   1. Right wrist pain  M25.531 719.43   2. Mass of soft tissue  M79.89 729.99       57-year-old female with 1 month history of fairly large volar right wrist ganglion type cyst.  Further discussion was had with the patient and on examination as well as imaging there is concern of radial nerve/artery involvement.  As result of this the patient we will further imaging diagnostic MRI right wrist.  If no significant involvement or confinement the patient will be agreeable to proceed with conservative aspiration and injection.  If there is involvement we discussed the potential for surgical intervention and ganglion cyst excision.  She will  return back upon completion of MRI for further evaluation.  She may implement NSAIDs as well as activity modification in the time being until follow-up for MRI review.                      This document was signed by Ifeanyi Remy PA-C March 3, 2023     CC: Albania Alexander APRN      Dictated Utilizing Dragon Dictation:   Please note that portions of this note were completed with a voice recognition program.   Part of this note may be an electronic transcription/translation of spoken language to printed text using the Dragon Dictation System.

## 2023-03-13 ENCOUNTER — HOSPITAL ENCOUNTER (OUTPATIENT)
Dept: MRI IMAGING | Facility: HOSPITAL | Age: 58
Discharge: HOME | End: 2023-03-13
Admitting: PHYSICIAN ASSISTANT
Payer: COMMERCIAL

## 2023-03-13 DIAGNOSIS — M79.89 MASS OF SOFT TISSUE: ICD-10-CM

## 2023-03-13 DIAGNOSIS — M25.531 RIGHT WRIST PAIN: ICD-10-CM

## 2023-03-13 PROCEDURE — 73221 MRI JOINT UPR EXTREM W/O DYE: CPT

## 2023-03-13 PROCEDURE — 73221 MRI JOINT UPR EXTREM W/O DYE: CPT | Performed by: RADIOLOGY

## 2023-03-24 ENCOUNTER — PATIENT ROUNDING (BHMG ONLY) (OUTPATIENT)
Dept: ORTHOPEDIC SURGERY | Facility: CLINIC | Age: 58
End: 2023-03-24
Payer: COMMERCIAL

## 2023-03-24 NOTE — PROGRESS NOTES
March 24, 2023    Hello, may I speak with Lolita Ng?    My name is Rosa CORTES.      I am  with [facility]  [facility]  [address]  [place] [address] [phone].    Before we get started may I verify your date of birth? 1965    I am calling to officially welcome you to our practice and ask about your recent visit. Is this a good time to talk? Yes    Tell me about your visit with us. What things went well?  Everything went fine.        We're always looking for ways to make our patients' experiences even better. Do you have recommendations on ways we may improve?  None    Overall were you satisfied with your first visit to our practice? Yes       I appreciate you taking the time to speak with me today. Is there anything else I can do for you? No      Thank you, and have a great day.

## 2023-03-29 ENCOUNTER — OFFICE VISIT (OUTPATIENT)
Dept: ORTHOPEDIC SURGERY | Facility: CLINIC | Age: 58
End: 2023-03-29
Payer: COMMERCIAL

## 2023-03-29 VITALS — WEIGHT: 110 LBS | BODY MASS INDEX: 20.77 KG/M2 | HEIGHT: 61 IN

## 2023-03-29 DIAGNOSIS — M79.89 MASS OF SOFT TISSUE: ICD-10-CM

## 2023-03-29 DIAGNOSIS — M67.439 PALMAR WRIST GANGLION: Primary | ICD-10-CM

## 2023-03-29 PROCEDURE — 99214 OFFICE O/P EST MOD 30 MIN: CPT | Performed by: PHYSICIAN ASSISTANT

## 2023-03-29 NOTE — PROGRESS NOTES
[facility] [place] [facility] and Physical          Patient: Lolita Ng  YOB: 1965  Date of Encounter: 03/29/2023  PCP: Albania Alexander APRN      Subjective     Chief Complaint   Patient presents with   • Right Wrist - Follow-up, Pain           History of Present Illness:     Lolita Ng is a 57 y.o. female presents today secondary to a near 2 month history of abrupt onset swelling and pain to the volar aspect of the right wrist.  Patient reports that this has increased over size and there is now soft tissue mass grape sized along the volar aspect of the wrist.  Patient reports she has difficulty with repetitive activities or any lifting.  She has undergone MRI relative to the pulsatile nature of the mass and presents today for MRI results review.  She reports progressive worsening of pain symptoms since last office visit. She denies any skin opening or drainage.        Patient Active Problem List   Diagnosis   • Weight loss   • Nonintractable headache     Past Medical History:   Diagnosis Date   • GERD (gastroesophageal reflux disease)    • H. pylori infection      Past Surgical History:   Procedure Laterality Date   • BREAST IMPLANT REMOVAL     • BREAST IMPLANT SURGERY     • CHOLECYSTECTOMY     • COLONOSCOPY     • ENDOSCOPY N/A 10/05/2020    Procedure: ESOPHAGOGASTRODUODENOSCOPY WITH BIOPSY CPT CODE: 52811;  Surgeon: Luca Modi MD;  Location: [facility];  Service: Gastroenterology;  Laterality: N/A;   • TUBAL ABDOMINAL LIGATION       Social History     Occupational History   • Not on file   Tobacco Use   • Smoking status: Every Day     Packs/day: 0.50     Years: 20.00     Pack years: 10.00     Types: Cigarettes   • Smokeless tobacco: Never   Vaping Use   • Vaping Use: Never used   Substance and Sexual Activity   • Alcohol use: Never   • Drug use: Never   • Sexual activity: Defer    Lolita Ng  reports that she has been smoking cigarettes. She has a 10.00  "pack-year smoking history. She has never used smokeless tobacco.. I have educated her on the risk of diseases from using tobacco products such as cancer, COPD and heart disease.     I advised her to quit and she is not willing to quit.         Social History     Social History Narrative   • Not on file     Family History   Problem Relation Age of Onset   • Ovarian cancer Mother    • Cancer Father      Current Outpatient Medications   Medication Sig Dispense Refill   • clonazePAM (KlonoPIN) 0.5 MG tablet Take 1 tablet by mouth 2 (Two) Times a Day As Needed.     • sertraline (ZOLOFT) 25 MG tablet Take 1 tablet by mouth Daily.       No current facility-administered medications for this visit.     Allergies   Allergen Reactions   • Codeine Nausea And Vomiting            Review of Systems   Constitutional: Negative.   HENT: Negative.    Eyes: Negative.    Cardiovascular: Negative.    Respiratory: Negative.    Endocrine: Negative.    Hematologic/Lymphatic: Negative.    Skin: Negative.    Musculoskeletal:        Pertinent positives listed in HPI   Gastrointestinal: Negative.    Genitourinary: Negative.    Neurological: Negative.    Psychiatric/Behavioral: The patient is nervous/anxious.    Allergic/Immunologic: Negative.          Objective      Vitals:    03/29/23 1414   Weight: 49.9 kg (110 lb)   Height: 154.9 cm (61\")      BMI is within normal parameters. No other follow-up for BMI required.      Physical Exam  Vitals and nursing note reviewed.   Constitutional:       General: She is not in acute distress.     Appearance: She is not ill-appearing.   HENT:      Head: Normocephalic and atraumatic.      Right Ear: External ear normal.      Left Ear: External ear normal.      Nose: Nose normal. No congestion or rhinorrhea.   Eyes:      Extraocular Movements: Extraocular movements intact.      Conjunctiva/sclera: Conjunctivae normal.      Pupils: Pupils are equal, round, and reactive to light.   Cardiovascular:      Rate and " Rhythm: Normal rate and regular rhythm.      Pulses: Normal pulses.      Heart sounds: Normal heart sounds. No murmur heard.    No gallop.   Pulmonary:      Effort: Pulmonary effort is normal. No respiratory distress.      Breath sounds: No stridor. No wheezing, rhonchi or rales.   Abdominal:      General: There is no distension.   Musculoskeletal:      Right wrist: Swelling, deformity, effusion and tenderness present.      Cervical back: Normal range of motion.      Comments: Large grape sized cystic nodule volar radial sided wrist.  This is not freely movable and there is no associated ecchymosis or erythema.  The nodule is pulsatile.   Skin:     General: Skin is warm and dry.      Capillary Refill: Capillary refill takes less than 2 seconds.   Neurological:      General: No focal deficit present.      Mental Status: She is alert and oriented to person, place, and time.   Psychiatric:         Mood and Affect: Mood normal.         Behavior: Behavior normal.         Thought Content: Thought content normal.         Judgment: Judgment normal.                 Radiology:      XR Wrist 3+ View Right    Result Date: 3/3/2023  1.  Soft tissue density along the radial aspect of the wrist soft tissues superficial in location and may represent subcutaneous cyst or possible ganglion cyst. 2.  No fracture or dislocation.  This report was finalized on 3/3/2023 9:49 AM by Dr. Lino Osborne MD.      MRI Wrist Right Without Contrast    Result Date: 3/13/2023  Appearance suggestive of large ganglion cyst with internal septation corresponding to the palpable abnormality adjacent to the distal radius.  This report was finalized on 3/13/2023 2:22 PM by Dr. Delvis Vogel MD.              Assessment/Plan        ICD-10-CM ICD-9-CM   1. Palmar wrist ganglion  M67.439 727.41   2. Mass of soft tissue  M79.89 729.99       57-year-old female with 2 month history of fairly large volar right wrist ganglion type cyst.  MRI reveals evidence  consistent with large ganglion cyst with internal septation.  Secondary to the location and appearance the patient is agreeable to proceeding forward with surgical intervention and excision soft tissue mass right volar wrist. We will plan on proceeding. I reviewed details of procedure with patient today and discussed risks, benefits, alternatives, and limitations of the procedure in laymen's terms with the risks including but not limited to:  neurovascular damage, bleeding, infection, chronic pain, worsening of pain, swelling, loss of motion, weakness, stiffness, instability, DVT, pulmonary embolus, loss of life/limb, and potential need for additional procedures.  No guarantees were given regarding results of surgery.     Patient was given the opportunity to ask and have all questions answered today.  The patient voiced understanding of the risks, benefits, and recalcitrance from conservative forms of treatment that were discussed and the patient consents to proceed with Excision of soft tissue mass right volar wrist.  Patient was seen in conjunction with Dr. Dykes she will be subsequently placed on his OR schedule accordingly.                    This document was signed by Ifeanyi Remy PA-C March 29, 2023     CC: Albania Alexander APRN      Dictated Utilizing Dragon Dictation:   Please note that portions of this note were completed with a voice recognition program.   Part of this note may be an electronic transcription/translation of spoken language to printed text using the Dragon Dictation System.

## 2023-03-29 NOTE — H&P (VIEW-ONLY)
[facility] [place] [facility] [place] [facility] Physical          Patient: Lolita Ng  YOB: 1965  Date of Encounter: 03/29/2023  PCP: Albania Alexander APRN      Subjective     Chief Complaint   Patient presents with   • Right Wrist - Follow-up, Pain           History of Present Illness:     Lolita Ng is a 57 y.o. female presents today secondary to a near 2 month history of abrupt onset swelling and pain to the volar aspect of the right wrist.  Patient reports that this has increased over size and there is now soft tissue mass grape sized along the volar aspect of the wrist.  Patient reports she has difficulty with repetitive activities or any lifting.  She has undergone MRI relative to the pulsatile nature of the mass and presents today for MRI results review.  She reports progressive worsening of pain symptoms since last office visit. She denies any skin opening or drainage.        Patient Active Problem List   Diagnosis   • Weight loss   • Nonintractable headache     Past Medical History:   Diagnosis Date   • GERD (gastroesophageal reflux disease)    • H. pylori infection      Past Surgical History:   Procedure Laterality Date   • BREAST IMPLANT REMOVAL     • BREAST IMPLANT SURGERY     • CHOLECYSTECTOMY     • COLONOSCOPY     • ENDOSCOPY N/A 10/05/2020    Procedure: ESOPHAGOGASTRODUODENOSCOPY WITH BIOPSY CPT CODE: 88576;  Surgeon: Luca Modi MD;  Location: [facility];  Service: Gastroenterology;  Laterality: N/A;   • TUBAL ABDOMINAL LIGATION       Social History     Occupational History   • Not on file   Tobacco Use   • Smoking status: Every Day     Packs/day: 0.50     Years: 20.00     Pack years: 10.00     Types: Cigarettes   • Smokeless tobacco: Never   Vaping Use   • Vaping Use: Never used   Substance and Sexual Activity   • Alcohol use: Never   • Drug use: Never   • Sexual activity: Defer    Lolita Ng  reports that she has been smoking cigarettes. She has a 10.00  "pack-year smoking history. She has never used smokeless tobacco.. I have educated her on the risk of diseases from using tobacco products such as cancer, COPD and heart disease.     I advised her to quit and she is not willing to quit.         Social History     Social History Narrative   • Not on file     Family History   Problem Relation Age of Onset   • Ovarian cancer Mother    • Cancer Father      Current Outpatient Medications   Medication Sig Dispense Refill   • clonazePAM (KlonoPIN) 0.5 MG tablet Take 1 tablet by mouth 2 (Two) Times a Day As Needed.     • sertraline (ZOLOFT) 25 MG tablet Take 1 tablet by mouth Daily.       No current facility-administered medications for this visit.     Allergies   Allergen Reactions   • Codeine Nausea And Vomiting            Review of Systems   Constitutional: Negative.   HENT: Negative.    Eyes: Negative.    Cardiovascular: Negative.    Respiratory: Negative.    Endocrine: Negative.    Hematologic/Lymphatic: Negative.    Skin: Negative.    Musculoskeletal:        Pertinent positives listed in HPI   Gastrointestinal: Negative.    Genitourinary: Negative.    Neurological: Negative.    Psychiatric/Behavioral: The patient is nervous/anxious.    Allergic/Immunologic: Negative.          Objective      Vitals:    03/29/23 1414   Weight: 49.9 kg (110 lb)   Height: 154.9 cm (61\")      BMI is within normal parameters. No other follow-up for BMI required.      Physical Exam  Vitals and nursing note reviewed.   Constitutional:       General: She is not in acute distress.     Appearance: She is not ill-appearing.   HENT:      Head: Normocephalic and atraumatic.      Right Ear: External ear normal.      Left Ear: External ear normal.      Nose: Nose normal. No congestion or rhinorrhea.   Eyes:      Extraocular Movements: Extraocular movements intact.      Conjunctiva/sclera: Conjunctivae normal.      Pupils: Pupils are equal, round, and reactive to light.   Cardiovascular:      Rate and " Rhythm: Normal rate and regular rhythm.      Pulses: Normal pulses.      Heart sounds: Normal heart sounds. No murmur heard.    No gallop.   Pulmonary:      Effort: Pulmonary effort is normal. No respiratory distress.      Breath sounds: No stridor. No wheezing, rhonchi or rales.   Abdominal:      General: There is no distension.   Musculoskeletal:      Right wrist: Swelling, deformity, effusion and tenderness present.      Cervical back: Normal range of motion.      Comments: Large grape sized cystic nodule volar radial sided wrist.  This is not freely movable and there is no associated ecchymosis or erythema.  The nodule is pulsatile.   Skin:     General: Skin is warm and dry.      Capillary Refill: Capillary refill takes less than 2 seconds.   Neurological:      General: No focal deficit present.      Mental Status: She is alert and oriented to person, place, and time.   Psychiatric:         Mood and Affect: Mood normal.         Behavior: Behavior normal.         Thought Content: Thought content normal.         Judgment: Judgment normal.                 Radiology:      XR Wrist 3+ View Right    Result Date: 3/3/2023  1.  Soft tissue density along the radial aspect of the wrist soft tissues superficial in location and may represent subcutaneous cyst or possible ganglion cyst. 2.  No fracture or dislocation.  This report was finalized on 3/3/2023 9:49 AM by Dr. Lino Osborne MD.      MRI Wrist Right Without Contrast    Result Date: 3/13/2023  Appearance suggestive of large ganglion cyst with internal septation corresponding to the palpable abnormality adjacent to the distal radius.  This report was finalized on 3/13/2023 2:22 PM by Dr. Delvis Vogel MD.              Assessment/Plan        ICD-10-CM ICD-9-CM   1. Palmar wrist ganglion  M67.439 727.41   2. Mass of soft tissue  M79.89 729.99       57-year-old female with 2 month history of fairly large volar right wrist ganglion type cyst.  MRI reveals evidence  consistent with large ganglion cyst with internal septation.  Secondary to the location and appearance the patient is agreeable to proceeding forward with surgical intervention and excision soft tissue mass right volar wrist. We will plan on proceeding. I reviewed details of procedure with patient today and discussed risks, benefits, alternatives, and limitations of the procedure in laymen's terms with the risks including but not limited to:  neurovascular damage, bleeding, infection, chronic pain, worsening of pain, swelling, loss of motion, weakness, stiffness, instability, DVT, pulmonary embolus, loss of life/limb, and potential need for additional procedures.  No guarantees were given regarding results of surgery.     Patient was given the opportunity to ask and have all questions answered today.  The patient voiced understanding of the risks, benefits, and recalcitrance from conservative forms of treatment that were discussed and the patient consents to proceed with Excision of soft tissue mass right volar wrist.  Patient was seen in conjunction with Dr. Dykes she will be subsequently placed on his OR schedule accordingly.                    This document was signed by Ifeanyi Remy PA-C March 29, 2023     CC: Albania Alexander APRN      Dictated Utilizing Dragon Dictation:   Please note that portions of this note were completed with a voice recognition program.   Part of this note may be an electronic transcription/translation of spoken language to printed text using the Dragon Dictation System.

## 2023-03-30 PROBLEM — M79.89 MASS OF SOFT TISSUE: Status: ACTIVE | Noted: 2023-03-30

## 2023-03-30 PROBLEM — M67.439 PALMAR WRIST GANGLION: Status: ACTIVE | Noted: 2023-03-30

## 2023-04-20 ENCOUNTER — ANESTHESIA EVENT (OUTPATIENT)
Dept: PERIOP | Facility: HOSPITAL | Age: 58
End: 2023-04-20
Payer: COMMERCIAL

## 2023-04-20 ENCOUNTER — HOSPITAL ENCOUNTER (OUTPATIENT)
Facility: HOSPITAL | Age: 58
Setting detail: HOSPITAL OUTPATIENT SURGERY
Discharge: HOME | End: 2023-04-20
Attending: ORTHOPAEDIC SURGERY | Admitting: ORTHOPAEDIC SURGERY
Payer: COMMERCIAL

## 2023-04-20 ENCOUNTER — ANESTHESIA (OUTPATIENT)
Dept: PERIOP | Facility: HOSPITAL | Age: 58
End: 2023-04-20
Payer: COMMERCIAL

## 2023-04-20 VITALS
OXYGEN SATURATION: 97 % | RESPIRATION RATE: 18 BRPM | DIASTOLIC BLOOD PRESSURE: 68 MMHG | BODY MASS INDEX: 20.78 KG/M2 | SYSTOLIC BLOOD PRESSURE: 112 MMHG | TEMPERATURE: 97.4 F | WEIGHT: 110 LBS | HEART RATE: 73 BPM

## 2023-04-20 DIAGNOSIS — M67.439 PALMAR WRIST GANGLION: ICD-10-CM

## 2023-04-20 DIAGNOSIS — M79.89 MASS OF SOFT TISSUE: ICD-10-CM

## 2023-04-20 PROCEDURE — 25010000002 ONDANSETRON PER 1 MG: Performed by: NURSE ANESTHETIST, CERTIFIED REGISTERED

## 2023-04-20 PROCEDURE — 25010000002 PHENYLEPHRINE 10 MG/ML SOLUTION: Performed by: NURSE ANESTHETIST, CERTIFIED REGISTERED

## 2023-04-20 PROCEDURE — 25111 REMOVE WRIST TENDON LESION: CPT | Performed by: ORTHOPAEDIC SURGERY

## 2023-04-20 PROCEDURE — 25010000002 PROPOFOL 10 MG/ML EMULSION: Performed by: NURSE ANESTHETIST, CERTIFIED REGISTERED

## 2023-04-20 PROCEDURE — 25010000002 FENTANYL CITRATE (PF) 50 MCG/ML SOLUTION: Performed by: NURSE ANESTHETIST, CERTIFIED REGISTERED

## 2023-04-20 PROCEDURE — 0 LIDOCAINE 1 % SOLUTION: Performed by: ORTHOPAEDIC SURGERY

## 2023-04-20 PROCEDURE — 25010000002 MIDAZOLAM PER 1 MG: Performed by: NURSE ANESTHETIST, CERTIFIED REGISTERED

## 2023-04-20 RX ORDER — MIDAZOLAM HYDROCHLORIDE 1 MG/ML
1 INJECTION INTRAMUSCULAR; INTRAVENOUS
Status: DISCONTINUED | OUTPATIENT
Start: 2023-04-20 | End: 2023-04-20 | Stop reason: HOSPADM

## 2023-04-20 RX ORDER — PROPOFOL 10 MG/ML
VIAL (ML) INTRAVENOUS AS NEEDED
Status: DISCONTINUED | OUTPATIENT
Start: 2023-04-20 | End: 2023-04-20 | Stop reason: SURG

## 2023-04-20 RX ORDER — ONDANSETRON 2 MG/ML
4 INJECTION INTRAMUSCULAR; INTRAVENOUS AS NEEDED
Status: DISCONTINUED | OUTPATIENT
Start: 2023-04-20 | End: 2023-04-20 | Stop reason: HOSPADM

## 2023-04-20 RX ORDER — BUPIVACAINE HYDROCHLORIDE 5 MG/ML
INJECTION, SOLUTION PERINEURAL AS NEEDED
Status: DISCONTINUED | OUTPATIENT
Start: 2023-04-20 | End: 2023-04-20 | Stop reason: HOSPADM

## 2023-04-20 RX ORDER — EPHEDRINE SULFATE 5 MG/ML
INJECTION INTRAVENOUS AS NEEDED
Status: DISCONTINUED | OUTPATIENT
Start: 2023-04-20 | End: 2023-04-20 | Stop reason: SURG

## 2023-04-20 RX ORDER — SODIUM CHLORIDE 0.9 % (FLUSH) 0.9 %
10 SYRINGE (ML) INJECTION EVERY 12 HOURS SCHEDULED
Status: DISCONTINUED | OUTPATIENT
Start: 2023-04-20 | End: 2023-04-20 | Stop reason: HOSPADM

## 2023-04-20 RX ORDER — SODIUM CHLORIDE 9 MG/ML
40 INJECTION, SOLUTION INTRAVENOUS AS NEEDED
Status: DISCONTINUED | OUTPATIENT
Start: 2023-04-20 | End: 2023-04-20 | Stop reason: HOSPADM

## 2023-04-20 RX ORDER — OXYCODONE HYDROCHLORIDE AND ACETAMINOPHEN 5; 325 MG/1; MG/1
1 TABLET ORAL ONCE AS NEEDED
Status: DISCONTINUED | OUTPATIENT
Start: 2023-04-20 | End: 2023-04-20 | Stop reason: HOSPADM

## 2023-04-20 RX ORDER — OXYCODONE HYDROCHLORIDE AND ACETAMINOPHEN 5; 325 MG/1; MG/1
1 TABLET ORAL EVERY 4 HOURS PRN
Qty: 8 TABLET | Refills: 0 | Status: SHIPPED | OUTPATIENT
Start: 2023-04-20

## 2023-04-20 RX ORDER — KETOROLAC TROMETHAMINE 30 MG/ML
15 INJECTION, SOLUTION INTRAMUSCULAR; INTRAVENOUS EVERY 6 HOURS PRN
Status: DISCONTINUED | OUTPATIENT
Start: 2023-04-20 | End: 2023-04-20 | Stop reason: HOSPADM

## 2023-04-20 RX ORDER — MIDAZOLAM HYDROCHLORIDE 1 MG/ML
INJECTION INTRAMUSCULAR; INTRAVENOUS AS NEEDED
Status: DISCONTINUED | OUTPATIENT
Start: 2023-04-20 | End: 2023-04-20 | Stop reason: SURG

## 2023-04-20 RX ORDER — SODIUM CHLORIDE, SODIUM LACTATE, POTASSIUM CHLORIDE, CALCIUM CHLORIDE 600; 310; 30; 20 MG/100ML; MG/100ML; MG/100ML; MG/100ML
125 INJECTION, SOLUTION INTRAVENOUS ONCE
Status: COMPLETED | OUTPATIENT
Start: 2023-04-20 | End: 2023-04-20

## 2023-04-20 RX ORDER — SODIUM CHLORIDE, SODIUM LACTATE, POTASSIUM CHLORIDE, CALCIUM CHLORIDE 600; 310; 30; 20 MG/100ML; MG/100ML; MG/100ML; MG/100ML
100 INJECTION, SOLUTION INTRAVENOUS ONCE AS NEEDED
Status: DISCONTINUED | OUTPATIENT
Start: 2023-04-20 | End: 2023-04-20 | Stop reason: HOSPADM

## 2023-04-20 RX ORDER — ONDANSETRON 2 MG/ML
INJECTION INTRAMUSCULAR; INTRAVENOUS AS NEEDED
Status: DISCONTINUED | OUTPATIENT
Start: 2023-04-20 | End: 2023-04-20 | Stop reason: SURG

## 2023-04-20 RX ORDER — SODIUM CHLORIDE 0.9 % (FLUSH) 0.9 %
10 SYRINGE (ML) INJECTION AS NEEDED
Status: DISCONTINUED | OUTPATIENT
Start: 2023-04-20 | End: 2023-04-20 | Stop reason: HOSPADM

## 2023-04-20 RX ORDER — LIDOCAINE HYDROCHLORIDE 10 MG/ML
INJECTION, SOLUTION INFILTRATION; PERINEURAL AS NEEDED
Status: DISCONTINUED | OUTPATIENT
Start: 2023-04-20 | End: 2023-04-20 | Stop reason: HOSPADM

## 2023-04-20 RX ORDER — MEPERIDINE HYDROCHLORIDE 25 MG/ML
12.5 INJECTION INTRAMUSCULAR; INTRAVENOUS; SUBCUTANEOUS
Status: DISCONTINUED | OUTPATIENT
Start: 2023-04-20 | End: 2023-04-20 | Stop reason: HOSPADM

## 2023-04-20 RX ORDER — PHENYLEPHRINE HYDROCHLORIDE 10 MG/ML
INJECTION INTRAVENOUS AS NEEDED
Status: DISCONTINUED | OUTPATIENT
Start: 2023-04-20 | End: 2023-04-20 | Stop reason: SURG

## 2023-04-20 RX ORDER — IPRATROPIUM BROMIDE AND ALBUTEROL SULFATE 2.5; .5 MG/3ML; MG/3ML
3 SOLUTION RESPIRATORY (INHALATION) ONCE AS NEEDED
Status: DISCONTINUED | OUTPATIENT
Start: 2023-04-20 | End: 2023-04-20 | Stop reason: HOSPADM

## 2023-04-20 RX ORDER — FENTANYL CITRATE 50 UG/ML
50 INJECTION, SOLUTION INTRAMUSCULAR; INTRAVENOUS
Status: DISCONTINUED | OUTPATIENT
Start: 2023-04-20 | End: 2023-04-20 | Stop reason: HOSPADM

## 2023-04-20 RX ORDER — FENTANYL CITRATE 50 UG/ML
INJECTION, SOLUTION INTRAMUSCULAR; INTRAVENOUS AS NEEDED
Status: DISCONTINUED | OUTPATIENT
Start: 2023-04-20 | End: 2023-04-20 | Stop reason: SURG

## 2023-04-20 RX ORDER — MAGNESIUM HYDROXIDE 1200 MG/15ML
LIQUID ORAL AS NEEDED
Status: DISCONTINUED | OUTPATIENT
Start: 2023-04-20 | End: 2023-04-20 | Stop reason: HOSPADM

## 2023-04-20 RX ADMIN — SODIUM CHLORIDE, POTASSIUM CHLORIDE, SODIUM LACTATE AND CALCIUM CHLORIDE: 600; 310; 30; 20 INJECTION, SOLUTION INTRAVENOUS at 09:31

## 2023-04-20 RX ADMIN — FENTANYL CITRATE 50 MCG: 50 INJECTION INTRAMUSCULAR; INTRAVENOUS at 09:44

## 2023-04-20 RX ADMIN — PROPOFOL 140 MG: 10 INJECTION, EMULSION INTRAVENOUS at 09:35

## 2023-04-20 RX ADMIN — EPHEDRINE SULFATE 10 MG: 5 INJECTION INTRAVENOUS at 09:54

## 2023-04-20 RX ADMIN — PHENYLEPHRINE HYDROCHLORIDE 100 MCG: 10 INJECTION INTRAVENOUS at 09:54

## 2023-04-20 RX ADMIN — FENTANYL CITRATE 50 MCG: 50 INJECTION INTRAMUSCULAR; INTRAVENOUS at 09:43

## 2023-04-20 RX ADMIN — PHENYLEPHRINE HYDROCHLORIDE 200 MCG: 10 INJECTION INTRAVENOUS at 09:46

## 2023-04-20 RX ADMIN — EPHEDRINE SULFATE 10 MG: 5 INJECTION INTRAVENOUS at 10:20

## 2023-04-20 RX ADMIN — ONDANSETRON 4 MG: 2 INJECTION INTRAMUSCULAR; INTRAVENOUS at 09:31

## 2023-04-20 RX ADMIN — EPHEDRINE SULFATE 10 MG: 5 INJECTION INTRAVENOUS at 09:52

## 2023-04-20 RX ADMIN — MIDAZOLAM HYDROCHLORIDE 2 MG: 1 INJECTION, SOLUTION INTRAMUSCULAR; INTRAVENOUS at 09:31

## 2023-04-20 NOTE — ANESTHESIA PREPROCEDURE EVALUATION
Anesthesia Evaluation     Patient summary reviewed and Nursing notes reviewed   no history of anesthetic complications:  NPO Solid Status: > 8 hours  NPO Liquid Status: > 8 hours           Airway   Mallampati: II  TM distance: >3 FB  Neck ROM: full  No difficulty expected  Dental - normal exam     Pulmonary - normal exam    breath sounds clear to auscultation  (+) a smoker Current Smoked day of surgery,   Cardiovascular - normal exam    Rhythm: regular  Rate: normal    (+) valvular problems/murmurs murmur,       Neuro/Psych  (+) headaches, psychiatric history Anxiety,    GI/Hepatic/Renal/Endo    (+)  GERD,  thyroid problem hyperthyroidism    Musculoskeletal (-) negative ROS    Abdominal  - normal exam   Substance History - negative use     OB/GYN negative ob/gyn ROS         Other - negative ROS                       Anesthesia Plan    ASA 2     MAC     intravenous induction     Anesthetic plan, risks, benefits, and alternatives have been provided, discussed and informed consent has been obtained with: patient.  Pre-procedure education provided  Plan discussed with CRNA.        CODE STATUS:

## 2023-04-20 NOTE — OP NOTE
WRIST GANGLION EXCISION  Procedure Note    Lolita Ng  4/20/2023    Pre-op Diagnosis:   Mass of soft tissue [M79.89]  Palmar wrist ganglion [M67.439]    Post-op Diagnosis:   :          Same           Procedure(s): Excision ganglion cyst volar aspect right wrist      Surgeon(s):  Ricky Dykes MD    Anesthesia: General/local    Operative technique: With patient in the operating theatre under general anesthesia with right hand and arm sterilely prepped and draped the extremity was exsanguinated and the tourniquet inflated to 200 mmHg.  Volar aspect right wrist radial side was marked for incision and then infiltrated with 4 cc of 0.5 Marcaine.  With the extremity exsanguinated and the tourniquet inflated to 200 mmHg angled incision was made directly over the volar ganglion cyst apex radial.  With skin divided sharply lap was created and retracted in an ulnar direction.  The cyst was identified bluntly released from surrounding soft tissue with the sensory branch radial nerve protected and retracted and radial artery identified proximally.  2 small perforating arteries were identified entering the cyst region and they were cauterized.  Radial artery was retracted and the cyst was further dissected from surrounding soft tissue including flexor carpi radialis tendon.  The base of the cyst extended down to the volar carpus.  Lightly the cyst was released and removed.  No other pathology was identified.  Tourniquet was deflated hemostasis obtained with electrocautery and with manual pressure.  The wound was closed in layers with 4-0 Monocryl and 3-0 nylon vertical mattress suture with sterile dressing applied she was taken to recovery room in stable condition.    Staff:   Circulator: Dary Garcia RN  Scrub Person: Marion Garcia LPN; Maryjane Roldan    Estimated Blood Loss: none    Specimens: Ganglion cyst             Implants/Grafts: none      Drains: None    Complications: none    Tourniquet time: 40    oksana Dykes MD     Date: 4/20/2023  Time: 10:57 EDT    Cc: [place] Alexander APRN

## 2023-04-20 NOTE — ANESTHESIA POSTPROCEDURE EVALUATION
Patient: Lolita Ng    Procedure Summary     Date: 04/20/23 Room / Location: [facility] OR 03 /  [facility] OR    Anesthesia Start: 0931 Anesthesia Stop: 1056    Procedure: MASS SOFT TISSUE EXCISION RIGHT WRIST (Right: Wrist) Diagnosis:       Mass of soft tissue      Palmar wrist ganglion      (Mass of soft tissue [M79.89])      (Palmar wrist ganglion [M67.439])    Surgeons: Ricky Dykes MD Provider: Alvin Stanford DO    Anesthesia Type: MAC ASA Status: 2          Anesthesia Type: MAC    Vitals  Vitals Value Taken Time   /67 04/20/23 1127   Temp 97.9 °F (36.6 °C) 04/20/23 1057   Pulse 75 04/20/23 1127   Resp 18 04/20/23 1127   SpO2 95 % 04/20/23 1127           Post Anesthesia Care and Evaluation    Patient location during evaluation: PHASE II  Patient participation: complete - patient participated  Level of consciousness: awake and alert  Pain score: 0  Pain management: adequate    Airway patency: patent  Anesthetic complications: No anesthetic complications  PONV Status: controlled  Cardiovascular status: acceptable  Respiratory status: acceptable and room air  Hydration status: euvolemic  No anesthesia care post op

## 2023-04-20 NOTE — ANESTHESIA PROCEDURE NOTES
Airway  Urgency: elective    Date/Time: 4/20/2023 9:36 AM  Airway not difficult    General Information and Staff    Patient location during procedure: OR  Anesthesiologist: Alvin Stanford DO  CRNA/CAA: Pedro Schultz CRNA    Indications and Patient Condition    Preoxygenated: yes  MILS not maintained throughout  Mask difficulty assessment: 0 - not attempted    Final Airway Details  Final airway type: supraglottic airway      Successful airway: unique  Size 4     Number of attempts at approach: 1  Assessment: lips, teeth, and gum same as pre-op and atraumatic intubation    Additional Comments  Atraumatic LMA placement, dentition unchanged.

## 2023-04-21 LAB — REF LAB TEST METHOD: NORMAL

## 2023-05-01 ENCOUNTER — OFFICE VISIT (OUTPATIENT)
Dept: ORTHOPEDIC SURGERY | Facility: CLINIC | Age: 58
End: 2023-05-01
Payer: COMMERCIAL

## 2023-05-01 VITALS — WEIGHT: 110.01 LBS | BODY MASS INDEX: 20.77 KG/M2 | HEIGHT: 61 IN

## 2023-05-01 DIAGNOSIS — Z09 POSTOP CHECK: Primary | ICD-10-CM

## 2023-05-01 NOTE — PROGRESS NOTES
Postoperative Follow-up          Patient: Lolita Ng  YOB: 1965  Date of Encounter: 05/01/2023      Chief Complaint:   Chief Complaint   Patient presents with   • Right Wrist - Post-op     Procedure(s): Excision ganglion cyst volar aspect right wrist  04/20/2023  Surgeon(s):  Ricky Dykes MD               HPI:  Lolita Ng, 57 y.o. female returns in postoperative follow-up excision ganglion cyst volar radial aspect right wrist she is 11 days postop and doing well.        Medical History:  Patient Active Problem List   Diagnosis   • Weight loss   • Nonintractable headache     Past Medical History:   Diagnosis Date   • Anxiety    • Disease of thyroid gland     hyperthyroidism   • GERD (gastroesophageal reflux disease)    • H. pylori infection    • Heart murmur            Surgical History:  Past Surgical History:   Procedure Laterality Date   • ABDOMINAL SURGERY     • BREAST IMPLANT REMOVAL     • BREAST IMPLANT SURGERY     • BREAST SURGERY     • CHOLECYSTECTOMY     • COLONOSCOPY     • ENDOSCOPY N/A 10/05/2020    Procedure: ESOPHAGOGASTRODUODENOSCOPY WITH BIOPSY CPT CODE: 95261;  Surgeon: Luca Modi MD;  Location: [facility];  Service: Gastroenterology;  Laterality: N/A;   • TUBAL ABDOMINAL LIGATION     • WRIST GANGLION EXCISION Right 4/20/2023    Procedure: MASS SOFT TISSUE EXCISION RIGHT WRIST;  Surgeon: Ricky Dykes MD;  Location: [facility];  Service: Orthopedics;  Laterality: Right;         Examination:  Examination right wrist reveals incision volar radial aspect intact sensation is intact to right hand and thumb.        Assessment & Plan:  57 y.o. female presents for follow-up excision ganglion cyst volar radial aspect right wrist doing well she is 11 days postop.  Today sutures were removed she is provided a simple wrist brace to protect her wrist she is encouraged to wear this with strenuous activity over the next 6 weeks.  She will return with any  residual problems in the future.       Diagnosis Plan   1. Postop check                Cc:  [place] Alexander, AISLINN              This document has been electronically signed by Ricky Dykes MD   May 5, 2023 13:42 EDT

## 2023-08-08 ENCOUNTER — TRANSCRIBE ORDERS (OUTPATIENT)
Dept: ADMINISTRATIVE | Facility: HOSPITAL | Age: 58
End: 2023-08-08
Payer: COMMERCIAL

## 2023-08-08 DIAGNOSIS — Z12.31 VISIT FOR SCREENING MAMMOGRAM: Primary | ICD-10-CM

## 2023-08-25 ENCOUNTER — HOSPITAL ENCOUNTER (OUTPATIENT)
Dept: MAMMOGRAPHY | Facility: HOSPITAL | Age: 58
Discharge: HOME | End: 2023-08-25
Admitting: OBSTETRICS & GYNECOLOGY
Payer: COMMERCIAL

## 2023-08-25 DIAGNOSIS — Z12.31 VISIT FOR SCREENING MAMMOGRAM: ICD-10-CM

## 2023-08-25 PROCEDURE — 77067 SCR MAMMO BI INCL CAD: CPT

## 2023-08-25 PROCEDURE — 77063 BREAST TOMOSYNTHESIS BI: CPT

## 2024-08-14 ENCOUNTER — TRANSCRIBE ORDERS (OUTPATIENT)
Dept: ADMINISTRATIVE | Facility: HOSPITAL | Age: 59
End: 2024-08-14
Payer: COMMERCIAL

## 2024-08-14 DIAGNOSIS — Z12.31 VISIT FOR SCREENING MAMMOGRAM: Primary | ICD-10-CM

## 2024-08-26 ENCOUNTER — HOSPITAL ENCOUNTER (OUTPATIENT)
Dept: MAMMOGRAPHY | Facility: HOSPITAL | Age: 59
Discharge: HOME | End: 2024-08-26
Admitting: PHYSICIAN ASSISTANT
Payer: COMMERCIAL

## 2024-08-26 DIAGNOSIS — Z12.31 VISIT FOR SCREENING MAMMOGRAM: ICD-10-CM

## 2024-08-26 PROCEDURE — 77067 SCR MAMMO BI INCL CAD: CPT | Performed by: RADIOLOGY

## 2024-08-26 PROCEDURE — 77063 BREAST TOMOSYNTHESIS BI: CPT | Performed by: RADIOLOGY

## 2024-08-26 PROCEDURE — 77063 BREAST TOMOSYNTHESIS BI: CPT

## 2024-08-26 PROCEDURE — 77067 SCR MAMMO BI INCL CAD: CPT

## (undated) DEVICE — PK EXTREM UPPR 70

## (undated) DEVICE — GLV SURG PREMIERPRO MIC LTX PF SZ7.5 BRN

## (undated) DEVICE — GOWN,REINF,POLY,ECL,PP SLV,XL: Brand: MEDLINE

## (undated) DEVICE — SYR LUERLOK 30CC

## (undated) DEVICE — SUT ETHLN 3/0 FS1 663G

## (undated) DEVICE — HOLDER: Brand: DEROYAL

## (undated) DEVICE — DISPOSABLE TOURNIQUET CUFF SINGLE BLADDER, SINGLE PORT AND QUICK CONNECT CONNECTOR: Brand: COLOR CUFF

## (undated) DEVICE — TBG PENCL TELESCP MEGADYNE SMOKE EVAC 10FT

## (undated) DEVICE — APPL CHLORAPREP HI/LITE 26ML ORNG

## (undated) DEVICE — Device

## (undated) DEVICE — THE BITE BLOCK MAXI, LATEX FREE STRAP IS USED TO PROTECT THE ENDOSCOPE INSERTION TUBE FROM BEING BITTEN BY THE PATIENT.

## (undated) DEVICE — PATIENT RETURN ELECTRODE, SINGLE-USE, CONTACT QUALITY MONITORING, ADULT, WITH 9FT CORD, FOR PATIENTS WEIGING OVER 33LBS. (15KG): Brand: MEGADYNE